# Patient Record
Sex: MALE | Race: WHITE | NOT HISPANIC OR LATINO | Employment: FULL TIME
[De-identification: names, ages, dates, MRNs, and addresses within clinical notes are randomized per-mention and may not be internally consistent; named-entity substitution may affect disease eponyms.]

---

## 2021-01-23 ENCOUNTER — HEALTH MAINTENANCE LETTER (OUTPATIENT)
Age: 61
End: 2021-01-23

## 2021-06-08 ASSESSMENT — ANXIETY QUESTIONNAIRES
7. FEELING AFRAID AS IF SOMETHING AWFUL MIGHT HAPPEN: NOT AT ALL
4. TROUBLE RELAXING: NOT AT ALL
5. BEING SO RESTLESS THAT IT IS HARD TO SIT STILL: SEVERAL DAYS
GAD7 TOTAL SCORE: 1
1. FEELING NERVOUS, ANXIOUS, OR ON EDGE: NOT AT ALL
2. NOT BEING ABLE TO STOP OR CONTROL WORRYING: NOT AT ALL
2. NOT BEING ABLE TO STOP OR CONTROL WORRYING: NOT AT ALL
7. FEELING AFRAID AS IF SOMETHING AWFUL MIGHT HAPPEN: NOT AT ALL
GAD7 TOTAL SCORE: 1
GAD7 TOTAL SCORE: 1
5. BEING SO RESTLESS THAT IT IS HARD TO SIT STILL: SEVERAL DAYS
7. FEELING AFRAID AS IF SOMETHING AWFUL MIGHT HAPPEN: NOT AT ALL
GAD7 TOTAL SCORE: 1
6. BECOMING EASILY ANNOYED OR IRRITABLE: NOT AT ALL
7. FEELING AFRAID AS IF SOMETHING AWFUL MIGHT HAPPEN: NOT AT ALL
4. TROUBLE RELAXING: NOT AT ALL
6. BECOMING EASILY ANNOYED OR IRRITABLE: NOT AT ALL
1. FEELING NERVOUS, ANXIOUS, OR ON EDGE: NOT AT ALL
3. WORRYING TOO MUCH ABOUT DIFFERENT THINGS: NOT AT ALL
GAD7 TOTAL SCORE: 1
3. WORRYING TOO MUCH ABOUT DIFFERENT THINGS: NOT AT ALL
GAD7 TOTAL SCORE: 1

## 2021-06-09 ASSESSMENT — ANXIETY QUESTIONNAIRES
GAD7 TOTAL SCORE: 1
GAD7 TOTAL SCORE: 1

## 2021-06-10 ENCOUNTER — VIRTUAL VISIT (OUTPATIENT)
Dept: INTERNAL MEDICINE | Facility: CLINIC | Age: 61
End: 2021-06-10
Payer: COMMERCIAL

## 2021-06-10 DIAGNOSIS — Z00.00 ENCOUNTER FOR PREVENTATIVE ADULT HEALTH CARE EXAMINATION: Primary | ICD-10-CM

## 2021-06-10 PROCEDURE — 99207 PR NO CHARGE LOS: CPT

## 2021-06-10 RX ORDER — IBUPROFEN 200 MG
200 TABLET ORAL PRN
COMMUNITY

## 2021-06-10 NOTE — PROGRESS NOTES
Health Maintenance:  Do you have a PCP? Yes  When was your last visit with your PCP? 1 year  When was your last eye exam? 1 year  Have you ever had a colonoscopy? Yes   If yes, when? 3 years  Have you ever had any polyps removed? No    As part of your visit we will set up a DEXA scan which will measure your body composition. We have a few questions that need to be answered before we can schedule this scan:   What is your approximate weight? 188   Have you ever had a DEXA scan within the past 2 years? No   Will you have any other imaging studies with contrast (x-ray, CT scan) within 7  days of this appointment? No   Have you had any spine or hip surgery? No   Do you take any vitamins that contain calcium or antacids with calcium? No    If yes, stop taking 24 hours prior to visit.     Goals for the Visit:  1. Thorough Comprehensive Preventive Exam  2. Headaches/screentime  3. Blood Pressure  4. Night eating  Pertinent past Medical/Family and Social HX:   Pertinent sx that desire are addressed with this visit:     Answers for HPI/ROS submitted by the patient on 6/8/2021   RAE 7 TOTAL SCORE: 1  General Symptoms: No  Skin Symptoms: No  HENT Symptoms: No  EYE SYMPTOMS: No  HEART SYMPTOMS: No  LUNG SYMPTOMS: No  INTESTINAL SYMPTOMS: No  URINARY SYMPTOMS: No  REPRODUCTIVE SYMPTOMS: No  SKELETAL SYMPTOMS: No  BLOOD SYMPTOMS: No  NERVOUS SYSTEM SYMPTOMS: No  MENTAL HEALTH SYMPTOMS: No    Instructions prior to appointment:   1. Fast beginning at 10 pm for lab appointment  2. If your preventive care assessment package includes a Fitness Assessment, please bring athletic shoes. Complementary [facility] fitness attire is provided and yours to keep.  3. If eye exam, eyes may be dilated, it will last 4-6 hours, may want to bring sunglasses.   4. May bring laptop or other work materials for use during downtime.   5. You will receive an email about 3 days prior to your visit with a final itinerary, menu selections  for the complementary breakfast and lunch and instructions for the visit.     Complimentary  Parking provided. Drop off car in front of [facility], take the patient elevators to the [facility]. When you enter in the lobby, identify yourself as an Executive [facility] [atient and you will be escorted up to the clinic.   If questions arise prior to your appointment please contact the clinic at [phone].

## 2021-06-16 DIAGNOSIS — I10 HYPERTENSION, UNSPECIFIED TYPE: Primary | ICD-10-CM

## 2021-06-16 NOTE — PROGRESS NOTES
"[facility] Outpatient Medical Nutrition Therapy      Time Spent:  60 minutes  Session Type:  Initial   Referral Source: [employer] Package/Dr. Ceasar Leung  Reason for RD Visit:   Nutritional counseling     Nutrition Assessment:  Patient (Asa) is here for initial annual visit with Registered Dietitian (DANISH).  Asa is a 61 y.o. male with history that includes GERD, hypertension, hyperlipidemia, coronary artery disease, diabetes/prediabetes (noted in EMR). He stated that he is interested in losing some weight. Overall, he feels that his diet is a healthy one but has not previously met with a dietitian. Additionally, he has made some changes to his diet a couple of weeks ago. For many years he had a bowl of cereal for breakfast but 2 weeks ago started having greek yogurt and granola for breakfast. Also over the past 20-30 years, he will sometimes get up and eat in the middle of the night. He believes he might be hungry but uncertain if this is just a habit. If he take one sip of diet sprite, this usually satisfies him and he goes right back to sleep. He is also currently reading a book about changing habits. Additionally, he stated that he used to salt all of his food right away without tasting it first for many years, but discontinued doing this. He was wondering the benefits of discontinuing added salt to foods    Estimated body mass index is 27.26 kg/m  as calculated from the following:    Height as of an earlier encounter on 6/17/21: 1.778 m (5' 10\").    Weight as of an earlier encounter on 6/17/21: 86.2 kg (190 lb).    Diet Recall:  (Some usual meals, snacks and beverages):  Meal Food    Breakfast Greek yogurt with granola OR previously for many years bowl of cereal   Lunch 2-3 oz cheese, ~6 triscuits, 1/2-1 whole avocado, yogurt and if he has some around additionally will have some salmon and/or HB egg, or sometimes dinner leftovers   Dinner Fish/red meat or other meat, salad, fruit or vegetarian " dish with salad and fruit (red meat 1-2x/week, other meat 1-2 x/week, fish 1-2x/week and vegetarian main course 1-2x/week)   Snacks Either none or has a chewy Religious granola bar while biking/exercising for awhile. If gets up in middle of night to eat then cheese and crackers.   Beverages 32 oz water, 2 coffees (am and afternoon, might be iced in summer in afternoon), 1x/week has a latte made with skim milk. Rare juice or tea.   Alcohol Intake 1 beer every day after dinner (between 8-9 pm), occasionally has a white Belizean too (estimated ~2 per week).     Frequency of eating/taking out meals: ~1x/week usually has fish, veg and rice or potato when eating out.     Labs:  Reviewed EMR.    Pertinent Medications/vitamin and mineral supplements:     Current Outpatient Medications   Medication     atorvastatin (LIPITOR) 20 MG tablet     diltiazem ER (TIAZAC) 300 MG 24 hr ER beaded capsule     fluocinonide (LIDEX) 0.05 % external solution     ibuprofen (ADVIL/MOTRIN) 200 MG tablet     irbesartan-hydrochlorothiazide (AVALIDE) 300-12.5 MG tablet     loratadine-pseudoePHEDrine (CLARITIN-D 12-HOUR) 5-120 MG 12 hr tablet     No current facility-administered medications for this visit.      Food Allergies:  NKFA     Physical Activity:  6-7 times per week exercises for about 2-3 hours. Road and mountain biking, canoeing, standing board, kettlebells (1-2x/week for 45 minutes). In winter cross country skiing and snowshoeing and biking on snow.    Nutrition Diagnosis:    Food and nutrition related knowledge deficit related to lack of previous diet education as evidenced by pt report and interest in diet education.    Nutrition Prescription: Recommended general healthful diet/The Plate method    Nutrition Intervention:    Nutrition Education/Counseling:  -Provided general healthful diet nutrition education with tips and suggestions.   -Reviewed the Plate method meal plan with food groups and some example foods in groups.   --Discussed  "some specific tips and suggestion for foods he can add into meals to help get balanced meals and those based on his food preferences.  -Discussed general sodium recommendations of getting less than 2300 mg/day. Discussed some low sodium diet tips. Answered his question about the issue with too much sodium in diet especially as it relates to heart health and high blood pressure.  -Reviewed the difference between unsaturated and saturated fats with recommendation to aim for choosing unsaturated fat over saturated fats and discussed examples of both.  -Discussed adequate hydration/recommendations and encouraged pt to drink at least 64 oz (8 cups) per day.   --discussed other weight management tips and suggestions. Recommended eating slowing, stopping when satisfied, limiting alcoholic beverages, limit snack an okay for planned snack if going a long stretch between meals.    Answered patient's questions. Patient verbalized understanding of education provided. Provided pt with RD contact information and list of goals below.     Educational Materials Provided:  [employer] Daily Nutrition Plate method handout     Goals:    1. Keep daily food and beverage journals. Can use an rober such as \"Lose It\" or \"My Fitness Pal\" or in a notebook or other rober that you prefer.    2. Eat 3 balanced meals per day. Can use the Plate Method meal plan for general guidance on getting balanced meals.  --Use a smaller plate  --Make half your plate vegetables and fruit  --1/4 of your plate (~1 cup or less, whole grain starches).  --The other 1/4 of your plate (~3 oz/deck of cards size or 3/4 cup) of a lean protein food.  --Include a couple of healthy (more unsaturated fat) servings at a meal (for example 2T avocado or 1 tsp olive oil or 6 nuts or 1T nut butter = 1 fat serving).    3. If going longer than 4-6 hours between meals, such as you do between lunch and dinner meal, add in a healthy snacks (~100 calories).    4. Limit alcoholic beverages to " 1 standard drink per day or less (1 standard drink= 12 oz beer, 5 oz wine or 1.5 oz hard liquor).    5. Increase water to drink at least 64 oz (8 cups) per day.    6. Some ideas for your usual meal to make them more balanced include the following:  --Add in a fruit serving at breakfast and choose a low sugar, high protein granola.  --Add in some vegetables at lunch meal.  --Continue eating balanced dinner meal, but see if one plate is enough. If you still feel hungry after eating, can try having some additional vegetables and/or 1 oz of protein foods for example.  --If you will go a long stretch between lunch and dinner (longer than 4-5 hours), then add in a healthy planned snack at that time.    7. Eat slowly and take at least 20-30 minutes to eat a meal. This will give you time to feel more satisfied.    8. Avoid distractions while eating (such as working, tv, using the computer, using the phone).    Nutrition Monitoring and Evaluation: Will monitor adherence to nutrition recommendations at future RD visits.     Further Medical Nutrition Therapy:  Annual visits  Patient was encouraged to call/contact RD with any further questions.    Connie Reynolds, MS, RD, LD

## 2021-06-17 ENCOUNTER — APPOINTMENT (OUTPATIENT)
Dept: INTERNAL MEDICINE | Facility: CLINIC | Age: 61
End: 2021-06-17
Payer: COMMERCIAL

## 2021-06-17 ENCOUNTER — OFFICE VISIT (OUTPATIENT)
Dept: INTERNAL MEDICINE | Facility: CLINIC | Age: 61
End: 2021-06-17
Payer: COMMERCIAL

## 2021-06-17 ENCOUNTER — OFFICE VISIT (OUTPATIENT)
Dept: DERMATOLOGY | Facility: CLINIC | Age: 61
End: 2021-06-17
Payer: COMMERCIAL

## 2021-06-17 ENCOUNTER — OFFICE VISIT (OUTPATIENT)
Dept: AUDIOLOGY | Facility: CLINIC | Age: 61
End: 2021-06-17
Payer: COMMERCIAL

## 2021-06-17 ENCOUNTER — OFFICE VISIT (OUTPATIENT)
Dept: GASTROENTEROLOGY | Facility: CLINIC | Age: 61
End: 2021-06-17
Payer: COMMERCIAL

## 2021-06-17 ENCOUNTER — OFFICE VISIT (OUTPATIENT)
Dept: OPHTHALMOLOGY | Facility: CLINIC | Age: 61
End: 2021-06-17
Payer: COMMERCIAL

## 2021-06-17 ENCOUNTER — ANCILLARY PROCEDURE (OUTPATIENT)
Dept: BONE DENSITY | Facility: CLINIC | Age: 61
End: 2021-06-17
Payer: COMMERCIAL

## 2021-06-17 VITALS
BODY MASS INDEX: 27.2 KG/M2 | SYSTOLIC BLOOD PRESSURE: 130 MMHG | HEART RATE: 50 BPM | TEMPERATURE: 98 F | RESPIRATION RATE: 16 BRPM | DIASTOLIC BLOOD PRESSURE: 75 MMHG | HEIGHT: 70 IN | WEIGHT: 190 LBS | OXYGEN SATURATION: 98 %

## 2021-06-17 DIAGNOSIS — D22.9 MULTIPLE BENIGN NEVI: ICD-10-CM

## 2021-06-17 DIAGNOSIS — R51.9 CHRONIC NONINTRACTABLE HEADACHE, UNSPECIFIED HEADACHE TYPE: ICD-10-CM

## 2021-06-17 DIAGNOSIS — H02.834 DERMATOCHALASIS OF BOTH UPPER EYELIDS: ICD-10-CM

## 2021-06-17 DIAGNOSIS — R73.01 IMPAIRED FASTING GLUCOSE: ICD-10-CM

## 2021-06-17 DIAGNOSIS — Z00.00 ENCOUNTER FOR PREVENTATIVE ADULT HEALTH CARE EXAMINATION: ICD-10-CM

## 2021-06-17 DIAGNOSIS — E78.5 DYSLIPIDEMIA: ICD-10-CM

## 2021-06-17 DIAGNOSIS — E66.3 OVERWEIGHT: ICD-10-CM

## 2021-06-17 DIAGNOSIS — L91.8 SKIN TAG: Primary | ICD-10-CM

## 2021-06-17 DIAGNOSIS — R06.83 SNORING: ICD-10-CM

## 2021-06-17 DIAGNOSIS — I51.7 LEFT VENTRICULAR HYPERTROPHY: ICD-10-CM

## 2021-06-17 DIAGNOSIS — Z83.518 FAMILY HISTORY OF MACULAR DEGENERATION: ICD-10-CM

## 2021-06-17 DIAGNOSIS — M85.9 LOW BONE DENSITY: ICD-10-CM

## 2021-06-17 DIAGNOSIS — Z00.00 ENCOUNTER FOR PREVENTATIVE ADULT HEALTH CARE EXAMINATION: Primary | ICD-10-CM

## 2021-06-17 DIAGNOSIS — Z71.3 NUTRITIONAL COUNSELING: Primary | ICD-10-CM

## 2021-06-17 DIAGNOSIS — H52.13 MYOPIA OF BOTH EYES: Primary | ICD-10-CM

## 2021-06-17 DIAGNOSIS — H25.813 COMBINED FORMS OF AGE-RELATED CATARACT OF BOTH EYES: ICD-10-CM

## 2021-06-17 DIAGNOSIS — L73.9 FOLLICULITIS: ICD-10-CM

## 2021-06-17 DIAGNOSIS — H02.831 DERMATOCHALASIS OF BOTH UPPER EYELIDS: ICD-10-CM

## 2021-06-17 DIAGNOSIS — D23.10 PAPILLOMA OF EYELID, UNSPECIFIED LATERALITY: ICD-10-CM

## 2021-06-17 DIAGNOSIS — G89.29 CHRONIC NONINTRACTABLE HEADACHE, UNSPECIFIED HEADACHE TYPE: ICD-10-CM

## 2021-06-17 DIAGNOSIS — H93.13 TINNITUS OF BOTH EARS: Primary | ICD-10-CM

## 2021-06-17 DIAGNOSIS — I10 HYPERTENSION, UNSPECIFIED TYPE: ICD-10-CM

## 2021-06-17 DIAGNOSIS — Z71.82 EXERCISE COUNSELING: Primary | ICD-10-CM

## 2021-06-17 LAB
ALBUMIN UR-MCNC: NEGATIVE MG/DL
ALP SERPL-CCNC: 72 U/L (ref 40–150)
ALT SERPL W P-5'-P-CCNC: 37 U/L (ref 0–70)
APPEARANCE UR: CLEAR
BASOPHILS # BLD AUTO: 0 10E9/L (ref 0–0.2)
BASOPHILS NFR BLD AUTO: 0.8 %
BILIRUB UR QL STRIP: NEGATIVE
CALCIUM SERPL-MCNC: 8.6 MG/DL (ref 8.5–10.1)
CHOLEST SERPL-MCNC: 125 MG/DL
COLOR UR AUTO: YELLOW
CREAT SERPL-MCNC: 0.71 MG/DL (ref 0.66–1.25)
DEPRECATED CALCIDIOL+CALCIFEROL SERPL-MC: 38 UG/L (ref 20–75)
DIFFERENTIAL METHOD BLD: NORMAL
EOSINOPHIL # BLD AUTO: 0.3 10E9/L (ref 0–0.7)
EOSINOPHIL NFR BLD AUTO: 5.9 %
ERYTHROCYTE [DISTWIDTH] IN BLOOD BY AUTOMATED COUNT: 12.7 % (ref 10–15)
GFR SERPL CREATININE-BSD FRML MDRD: >90 ML/MIN/{1.73_M2}
GLUCOSE SERPL-MCNC: 100 MG/DL (ref 70–99)
GLUCOSE UR STRIP-MCNC: NEGATIVE MG/DL
HCT VFR BLD AUTO: 41.9 % (ref 40–53)
HCV AB SERPL QL IA: NONREACTIVE
HDLC SERPL-MCNC: 68 MG/DL
HGB BLD-MCNC: 14.3 G/DL (ref 13.3–17.7)
HGB UR QL STRIP: NEGATIVE
HIV 1+2 AB+HIV1 P24 AG SERPL QL IA: NONREACTIVE
IMM GRANULOCYTES # BLD: 0 10E9/L (ref 0–0.4)
IMM GRANULOCYTES NFR BLD: 0.2 %
KETONES UR STRIP-MCNC: NEGATIVE MG/DL
LDLC SERPL CALC-MCNC: 48 MG/DL
LEUKOCYTE ESTERASE UR QL STRIP: NEGATIVE
LYMPHOCYTES # BLD AUTO: 1.3 10E9/L (ref 0.8–5.3)
LYMPHOCYTES NFR BLD AUTO: 25.9 %
MCH RBC QN AUTO: 31.5 PG (ref 26.5–33)
MCHC RBC AUTO-ENTMCNC: 34.1 G/DL (ref 31.5–36.5)
MCV RBC AUTO: 92 FL (ref 78–100)
MONOCYTES # BLD AUTO: 0.6 10E9/L (ref 0–1.3)
MONOCYTES NFR BLD AUTO: 12.3 %
MUCOUS THREADS #/AREA URNS LPF: PRESENT /LPF
NEUTROPHILS # BLD AUTO: 2.7 10E9/L (ref 1.6–8.3)
NEUTROPHILS NFR BLD AUTO: 54.9 %
NITRATE UR QL: NEGATIVE
NONHDLC SERPL-MCNC: 58 MG/DL
NRBC # BLD AUTO: 0 10*3/UL
NRBC BLD AUTO-RTO: 0 /100
PH UR STRIP: 8 PH (ref 5–7)
PHOSPHATE SERPL-MCNC: 2.6 MG/DL (ref 2.5–4.5)
PLATELET # BLD AUTO: 180 10E9/L (ref 150–450)
POTASSIUM SERPL-SCNC: 3.7 MMOL/L (ref 3.4–5.3)
PSA SERPL-ACNC: 1.55 UG/L (ref 0–4)
RBC # BLD AUTO: 4.54 10E12/L (ref 4.4–5.9)
RBC #/AREA URNS AUTO: 0 /HPF (ref 0–2)
SODIUM SERPL-SCNC: 143 MMOL/L (ref 133–144)
SOURCE: ABNORMAL
SP GR UR STRIP: 1.01 (ref 1–1.03)
TRIGL SERPL-MCNC: 47 MG/DL
TSH SERPL DL<=0.005 MIU/L-ACNC: 1.85 MU/L (ref 0.4–4)
UROBILINOGEN UR STRIP-MCNC: 0 MG/DL (ref 0–2)
WBC # BLD AUTO: 5 10E9/L (ref 4–11)
WBC #/AREA URNS AUTO: <1 /HPF (ref 0–5)

## 2021-06-17 PROCEDURE — 99386 PREV VISIT NEW AGE 40-64: CPT | Performed by: INTERNAL MEDICINE

## 2021-06-17 PROCEDURE — 86803 HEPATITIS C AB TEST: CPT | Mod: 90 | Performed by: INTERNAL MEDICINE

## 2021-06-17 PROCEDURE — 94375 RESPIRATORY FLOW VOLUME LOOP: CPT | Performed by: INTERNAL MEDICINE

## 2021-06-17 PROCEDURE — 84460 ALANINE AMINO (ALT) (SGPT): CPT | Performed by: PATHOLOGY

## 2021-06-17 PROCEDURE — 82310 ASSAY OF CALCIUM: CPT | Performed by: PATHOLOGY

## 2021-06-17 PROCEDURE — 84244 ASSAY OF RENIN: CPT | Mod: 90 | Performed by: INTERNAL MEDICINE

## 2021-06-17 PROCEDURE — 99000 SPECIMEN HANDLING OFFICE-LAB: CPT | Performed by: INTERNAL MEDICINE

## 2021-06-17 PROCEDURE — 36415 COLL VENOUS BLD VENIPUNCTURE: CPT | Performed by: INTERNAL MEDICINE

## 2021-06-17 PROCEDURE — 92557 COMPREHENSIVE HEARING TEST: CPT | Performed by: AUDIOLOGIST

## 2021-06-17 PROCEDURE — 80061 LIPID PANEL: CPT | Performed by: PATHOLOGY

## 2021-06-17 PROCEDURE — 99207 PR NO CHARGE LOS: CPT

## 2021-06-17 PROCEDURE — 93000 ELECTROCARDIOGRAM COMPLETE: CPT | Performed by: INTERNAL MEDICINE

## 2021-06-17 PROCEDURE — 82306 VITAMIN D 25 HYDROXY: CPT | Mod: 90 | Performed by: INTERNAL MEDICINE

## 2021-06-17 PROCEDURE — 84165 PROTEIN E-PHORESIS SERUM: CPT | Mod: 90 | Performed by: PATHOLOGY

## 2021-06-17 PROCEDURE — 99N1036 PR STATISTIC TOTAL PROTEIN: Mod: 90 | Performed by: INTERNAL MEDICINE

## 2021-06-17 PROCEDURE — 96999 UNLISTED SPEC DERM SVC/PX: CPT | Performed by: INTERNAL MEDICINE

## 2021-06-17 PROCEDURE — 99207 PR NO BILLABLE SERVICE THIS VISIT: CPT | Performed by: DIETITIAN, REGISTERED

## 2021-06-17 PROCEDURE — 81001 URINALYSIS AUTO W/SCOPE: CPT | Performed by: PATHOLOGY

## 2021-06-17 PROCEDURE — 84295 ASSAY OF SERUM SODIUM: CPT | Performed by: PATHOLOGY

## 2021-06-17 PROCEDURE — 84132 ASSAY OF SERUM POTASSIUM: CPT | Performed by: PATHOLOGY

## 2021-06-17 PROCEDURE — 99203 OFFICE O/P NEW LOW 30 MIN: CPT | Performed by: PHYSICIAN ASSISTANT

## 2021-06-17 PROCEDURE — 84100 ASSAY OF PHOSPHORUS: CPT | Performed by: PATHOLOGY

## 2021-06-17 PROCEDURE — 84443 ASSAY THYROID STIM HORMONE: CPT | Performed by: PATHOLOGY

## 2021-06-17 PROCEDURE — 92550 TYMPANOMETRY & REFLEX THRESH: CPT | Performed by: AUDIOLOGIST

## 2021-06-17 PROCEDURE — 77080 DXA BONE DENSITY AXIAL: CPT | Performed by: INTERNAL MEDICINE

## 2021-06-17 PROCEDURE — 87389 HIV-1 AG W/HIV-1&-2 AB AG IA: CPT | Mod: 90 | Performed by: INTERNAL MEDICINE

## 2021-06-17 PROCEDURE — 84075 ASSAY ALKALINE PHOSPHATASE: CPT | Performed by: PATHOLOGY

## 2021-06-17 PROCEDURE — 85025 COMPLETE CBC W/AUTO DIFF WBC: CPT | Performed by: PATHOLOGY

## 2021-06-17 PROCEDURE — 84403 ASSAY OF TOTAL TESTOSTERONE: CPT | Mod: 90 | Performed by: INTERNAL MEDICINE

## 2021-06-17 PROCEDURE — 82947 ASSAY GLUCOSE BLOOD QUANT: CPT | Performed by: PATHOLOGY

## 2021-06-17 PROCEDURE — 92004 COMPRE OPH EXAM NEW PT 1/>: CPT | Performed by: OPTOMETRIST

## 2021-06-17 PROCEDURE — 92015 DETERMINE REFRACTIVE STATE: CPT | Performed by: OPTOMETRIST

## 2021-06-17 PROCEDURE — 82565 ASSAY OF CREATININE: CPT | Performed by: PATHOLOGY

## 2021-06-17 PROCEDURE — G0103 PSA SCREENING: HCPCS | Mod: 90 | Performed by: PATHOLOGY

## 2021-06-17 PROCEDURE — 82088 ASSAY OF ALDOSTERONE: CPT | Mod: 90 | Performed by: INTERNAL MEDICINE

## 2021-06-17 RX ORDER — CLINDAMYCIN PHOSPHATE 11.9 MG/ML
SOLUTION TOPICAL
Qty: 60 ML | Refills: 1 | Status: SHIPPED | OUTPATIENT
Start: 2021-06-17

## 2021-06-17 ASSESSMENT — CONF VISUAL FIELD
METHOD: COUNTING FINGERS
OS_NORMAL: 1
OD_NORMAL: 1

## 2021-06-17 ASSESSMENT — REFRACTION_WEARINGRX
OS_CYLINDER: +2.50
OS_SPHERE: -3.75
OD_CYLINDER: +2.00
OD_ADD: +2.50
SPECS_TYPE: PAL
OS_AXIS: 175
OS_ADD: +2.50
OD_AXIS: 178
OD_SPHERE: -3.25

## 2021-06-17 ASSESSMENT — VISUAL ACUITY
CORRECTION_TYPE: GLASSES
OD_CC: 20/20
OS_CC: 20/20
METHOD: SNELLEN - LINEAR

## 2021-06-17 ASSESSMENT — REFRACTION_MANIFEST
OS_AXIS: 175
OS_ADD: +2.50
OD_SPHERE: -3.50
OS_CYLINDER: +2.25
OD_ADD: +2.50
OD_AXIS: 178
OD_CYLINDER: +1.75
OS_SPHERE: -3.75

## 2021-06-17 ASSESSMENT — TONOMETRY
OD_IOP_MMHG: 15
OS_IOP_MMHG: 14
IOP_METHOD: ICARE

## 2021-06-17 ASSESSMENT — EXTERNAL EXAM - RIGHT EYE: OD_EXAM: NORMAL

## 2021-06-17 ASSESSMENT — CUP TO DISC RATIO
OD_RATIO: 0.5
OS_RATIO: 0.5

## 2021-06-17 ASSESSMENT — MIFFLIN-ST. JEOR: SCORE: 1663.08

## 2021-06-17 ASSESSMENT — EXTERNAL EXAM - LEFT EYE: OS_EXAM: NORMAL

## 2021-06-17 ASSESSMENT — PAIN SCALES - GENERAL
PAINLEVEL: NO PAIN (0)
PAINLEVEL: NO PAIN (0)

## 2021-06-17 NOTE — OUTPATIENT NURSE NOTE
06/17/21 1002   Fitness   Current Fitness Regimen:  Exercises 6-7 d/wk. Summer: single track mntn biking ~2 d/wk, 60-90 min; paddling ~2 d/wk,  min; soccer 1 d/wk, kettlebell & resistance training 1 d/wk, 45-60 min. Winter: skiing ~ 4 d/wk ~60+ min; fat tire biking ~ 2 d/wk 60+ min   Fitness Goals Continue to exercise as long as possible, perform well during races, lose ~ 10 lbs   Timeline   Recommended Activity this Week Continue current routine, but add interval workout 1 d/wk   Recommended Minutes per Day this Week 60 Min   Recommended Number of Days this Week 6 Per Day/Per Week   Recommended Activity this Month As above, but increase weight lifting to 2 d/wk   Recommended Duration this Month 60 Min/Hrs   Recommended Frequency this Month 6 Per Day/Per Week   Recommended Activity the Nex 3 Months As above   Recommended Duration the Nex 3 Months 60 Min/Hrs   Recommended Frequency the Nex 3 Months:  6 Per Day/Per Week   VO2 Max   VO2MAX:  30.5 ml/kg/min   VO2- max Percentile 25 %   Fitness Level Poor    Strength    Strength (Right):  100.7 ml/kg/min    Strength (Left) 91.5 ml/kg/min

## 2021-06-17 NOTE — NURSING NOTE
Chief Complaints and History of Present Illnesses   Patient presents with     COMPREHENSIVE EYE EXAM     Chief Complaint(s) and History of Present Illness(es)     COMPREHENSIVE EYE EXAM     Laterality: both eyes    Onset: years ago    Frequency: constantly    Course: stable    Treatments tried: no treatments    Pain scale: 0/10              Comments     Last eye exam 1.5 years. Pt feels right eye seems a little cloudy. Otherwise vision fine. No past eye surgery. CL once a week to play soccer.     VINICIUS Hernandez COT 10:27 AM June 17, 2021

## 2021-06-17 NOTE — NURSING NOTE
Chief Complaint   Patient presents with     Physical     Patient is here for annual physical     Carly Latif CMA 7:00 AM on 6/17/2021

## 2021-06-17 NOTE — PATIENT INSTRUCTIONS
"It was nice meeting you today. Below are the nutrition recommendations we discussed at your visit.    Please let me know if you have any additional questions.    Nutrition Recommendations    1. Keep daily food and beverage journals. Can use an rober such as \"Lose It\" or \"My Fitness Pal\" or in a notebook or other rober that you prefer.    2. Eat 3 balanced meals per day. Can use the Plate Method meal plan for general guidance on getting balanced meals.  --Use a smaller plate  --Make half your plate vegetables and fruit  --1/4 of your plate (~1 cup or less, whole grain starches).  --The other 1/4 of your plate (~3 oz/deck of cards size or 3/4 cup) of a lean protein food.  --Include a couple of healthy (more unsaturated fat) servings at a meal (for example 2T avocado or 1 tsp olive oil or 6 nuts or 1T nut butter = 1 fat serving).    3. If going longer than 4-6 hours between meals, such as you do between lunch and dinner meal, add in a healthy snacks (~100 calories).    4. Limit alcoholic beverages to 1 standard drink per day or less (1 standard drink= 12 oz beer, 5 oz wine or 1.5 oz hard liquor).    5. Increase water to drink at least 64 oz (8 cups) per day.    6. Some ideas for your usual meal to make them more balanced include the following:  --Add in a fruit serving at breakfast and choose a low sugar, high protein granola.  --Add in some vegetables at lunch meal.  --Continue eating balanced dinner meal, but see if one plate is enough. If you still feel hungry after eating, can try having some additional vegetables and/or 1 oz of protein foods for example.  --If you will go a long stretch between lunch and dinner (longer than 4-5 hours), then add in a healthy planned snack at that time.    7. Eat slowly and take at least 20-30 minutes to eat a meal. This will give you time to feel more satisfied.    8. Avoid distractions while eating (such as working, tv, using the computer, using the phone).    Connie Reynolds MS, RD, " LD

## 2021-06-17 NOTE — PATIENT INSTRUCTIONS
Asa,    It was great to meet you today! I was impressed by the wide variety and duration of exercise you get on a regular basis. I think next year we'll get more accurate results for the VO2max test given that you'll know what the feeling of the mask is like. Despite that, I think adding some more intense intervals to your routine would be beneficial. Here are my recommendations:    1.) Add 1 d/wk VO2max intervals. These should be in zone 5. Use a 1:1 or up to a 1:2 work:recovery ratio. The work should ideally last 2-3 minutes, but start with less if you need or move beyond 3 minutes if your fitness improves. When you finish an interval, you should feel like you could have gone another 20-30 seconds. Also, when you finish the last interval, you should feel like you could still complete another interval if you had to. Start with 3-4 intervals and increase from there. Finally, you can do this type of workout 2 d/wk after this becomes a regular part of your routine, but I would not go beyond 2.    2.) Increase your lifting to 2 d/wk, ideally  by at least 1 day. There are several reasons, but perhaps the best are that it will extend how long you can keep doing your favorite activities.    3.) Use your standing desk more to break up your sitting. You don't need to stand for extended periods of time, but breaking up your sitting about once an hour and standing for a while will help.      If you have questions, please email me; I enjoy hearing from patients!    Waldemar Frausto MS, Exercise Phsiology  [email or web address]

## 2021-06-17 NOTE — PROGRESS NOTES
[facility] Dermatology Note  Encounter Date: Jun 17, 2021  Office Visit      Dermatology Problem List:  1. Scalp folliculitis/Prurigo nodules - clindamycin 1% solution, fluocinonide 0.05% solution alternating    Social: Lives in [place], [place]  ____________________________________________    Assessment & Plan:  # Multiple clinically benign nevi on the trunk and extremities.   - ABCDEs: Counseled ABCDEs of melanoma: Asymmetry, Border (irregularity), Color (not uniform, changes in color), Diameter (greater than 6 mm which is about the size of a pencil eraser), and Evolving (any changes in preexisting moles).  - Monitor: Patient to continue monitoring at home and will contact the clinic for any changes.  - Sun protection: Counseled SPF30+ sunscreen, UPF clothing, sun avoidance, tanning bed avoidance.  - Continue with annual skin exams     # Acrochordon, non irritated.   - NTD: No further management at this time.     # Scalp folliculitis with early prurigo nodule formation  -continue fluocinonide solution every day  -start clindamycin 1% solution once to twice daily  -avoid picking, scratching or otherwise traumatizing the lesions as this prevents healing     Procedures Performed:   None    Follow-up: 1 year(s) in-person, or earlier for new or changing lesions    Staff:     All risks, benefits and alternatives were discussed with patient.  Patient is in agreement and understands the assessment and plan.  All questions were answered.    Ibis Raymundo PA-C, MPAS  [facility] [place]: Phone: [phone], Fax: [phone]  [facility]: Phone: [phone],  Fax: [phone]  ____________________________________________    CC: Skin Check (Asa is here for a skin check. He has lesions on his neck. He also has skin tags around his eyes. )      HPI:  Mr. Jeancarlos Ureña is a 61 year old male who presents today as a new  patient for a [facility] skin screening. Notes bumps on the back of the scalp. Using fluocinonide solution on scalp - using once per day now. Was using it 3-4x per day previously. Has had bumps on back of neck/scalp for better part of the year. Does see dermatology regularly, [facility]. Has had biopsies but nothing abnml. No fhx melanoma. No personal hx of skin cancer.     Patient is otherwise feeling well, without additional concerns.    Labs:  none    Physical Exam:  Vitals: There were no vitals taken for this visit.  SKIN: Full skin, which includes the head/face, both arms, chest, back, abdomen,both legs, genitalia and/or groin buttocks, digits and/or nails, was examined.   - posterior neck./occipital scalp -4-5 pink papules  - Multiple regular brown pigmented macules and papules are identified on the trunk and extremities, greater than 100 - signature nevus is very dark  -Berman's skin type III.   -There is(are) skin colored pedunculated papules on the face.   - No other lesions of concern on areas examined.     Medications:  Current Outpatient Medications   Medication     atorvastatin (LIPITOR) 20 MG tablet     diltiazem ER (TIAZAC) 300 MG 24 hr ER beaded capsule     fluocinonide (LIDEX) 0.05 % external solution     ibuprofen (ADVIL/MOTRIN) 200 MG tablet     irbesartan-hydrochlorothiazide (AVALIDE) 300-12.5 MG tablet     loratadine-pseudoePHEDrine (CLARITIN-D 12-HOUR) 5-120 MG 12 hr tablet     No current facility-administered medications for this visit.       Past Medical/Surgical History:   There is no problem list on file for this patient.    Past Medical History:   Diagnosis Date     Coronary artery disease 15 years ago    Enlarged heart?     Diabetes (H) Five years ago?    Pre-diabetes     Gastroesophageal reflux disease 2 and 20 years ago    Difficulty swallowing     Hyperlipidemia 2-3 years ago    Medication and now low     Hypertension Twenty years ago?    Two pills each day - 135/75        CC Dr. Knowles on close of this encounter.

## 2021-06-17 NOTE — LETTER
"June 24, 2021      Jeancarlos Ureña  [address] [place] [id number]              Dear Asa,     It was a pleasure to have met you at your recent visit to the [facility].  I am writing to report the results of your tests, and to review several recommendations.     A complete blood count was normal, including a white blood cell count of 5000, a hemoglobin level of 14.3, and a platelet count of 180,000.  A creatinine level, which measures kidney function, was normal at 0.71 milligrams/deciliter.  Alkaline phosphatase and alanine aminotransferase (ALT), enzymes that measure liver activity, were normal at 72 and 37 units/liter, respectively.     Your total cholesterol level was 125, with a triglyceride level of 47, an HDL or \"good\" cholesterol level of 68, an LDL or \"bad\" cholesterol level of 48, and a cholesterol/HDL ratio of 1.8.  Guidelines from the American Heart Association and American College of Cardiology would suggest continued use of a moderate-intensity statin, such as your atorvastatin, with daily use of low-strength (81 mg) aspirin.     A PSA level, which screens for prostate problems, was acceptable at 1.55.  A thyroid stimulating hormone (TSH) level, which measures thyroid function, was normal at 1.85.  Your 25-hydroxy vitamin D level was 38 (our normal range is 20-75).  Screens for HIV and hepatitis C were nonreactive, or negative/normal.  Analysis of your urine was notable only for a marginally elevated pH of 8.0.    Your glucose level was marginally elevated at 100.  Blood glucose readings in the range of 100-125 are considered evidence of impaired fasting glucose, or \"pre-diabetes\".  Maintenance of ideal weight, regular exercise, and a reduced-calorie diet, which specifically avoids sugars and starches, all can help postpone or prevent the development of diabetes.  In this setting, many diabetes experts suggest semiannual measurement of fasting glucose and " "glycosylated hemoglobin levels and annual measurement of urine albumin/creatinine.     An EKG was notable for sinus bradycardia with first-degree AV block and voltage findings consistent with left ventricular hypertrophy, all of which were noted previously.  A spirometry test, which measures lung function, showed an FEV1 of 3.20, with an FVC of 4.03; these readings were 92% and 88% of predicted values, respectively.     Dual energy x-ray absorptiometry (DEXA) showed low bone density, with most negative and valid T-score of -1.2 at the right femoral neck.  Additional tests to screen for underlying causes of bone loss include normal levels of calcium (8.6), phosphorus (2.6), and testosterone (363); a serum protein electrophoresis was notable for a small monoclonal protein in the gamma region.  I recommend that you return for or arrange through your usual clinic blood and urine tests for serum and urine immunofixation to further characterize this nonspecific finding.  In addition, you were advised to submit a 24-hour urine specimen for analysis of calcium excretion.  Further recommendations can be based on these test results.    To optimize bone health, you were advised to begin daily use of a 50 mcg (2000 international unit) vitamin D3 supplement, while maintaining a regimen of regular weight-bearing exercise and daily calcium intake of 1200 mg, preferably from dietary sources.    Body composition analysis showed 29.2% fat (82nd percentile).  Your body mass index was 27.3; readings of 20-25 are considered normal.  To facilitate weight and body fat loss, while building muscle mass, you were advised to continue a regimen of regular exercise, including aerobic (\"cardio\") and strength training, along with \"subthreshold\" exercise, during which pulse is maintained below 70% of maximum (in your case, roughly 110 beats per minute) to facilitate \"fat-burning\".  In addition, we discussed the time economy associated with " high-intensity interval training, such as a brief workout of 30 seconds of intense exercise, followed by 60 seconds of rest, repeated 5 times.    At the time of your appointment, your blood pressure was 133/78 on average of 3 automated readings.  Based on your report of consistently elevated blood pressure readings despite the use of 3 antihypertensive medications, you were advised to arrange a sleep clinic evaluation to exclude the possibility of obstructive sleep apnea.  In addition, screening tests for primary aldosteronism were arranged.  The preliminary tests showed a high-normal sodium level of 143, a low-normal potassium level of 3.7, and acceptable levels of aldosterone (11.5), and renin (2.4).  While these results are not diagnostic for primary aldosteronism, I believe further testing is warranted since use of a diuretic (hydrochlorothiazide, a component of Avalide) can result in false negative (normal) readings.  I suggest that you discuss with your usual internist or a nephrologist the possibility of repeating these tests after 2 weeks without diuretic treatment.    To address your headaches, which I suspect are related to chronic use of analgesics, you were advised to avoid all use of ibuprofen, naproxen, and acetaminophen, and to pursue further evaluation if your headaches persist beyond 3-4 weeks.  In the event of infrequent future headaches, I recommended a trial of acetaminophen/aspirin/caffeine (Excedrin, and others) as needed.    With regard to preventive care, you indicated that 10-year follow-up was recommended at the time of a colonoscopy in 2018.  I recommend that you proceed with a recombinant zoster (shingles) vaccination series at your convenience.  You were commended for your regimen of regular exercise, and advised to implement the modifications noted above.     Asa, I enjoyed visiting with you at your recent appointment and learning about your family members and their impressive  accomplishments. Thank you for choosing the [facility] program and [facility] for your medical needs.  Best wishes for a productive and healthy year.  Please do not hesitate to contact me with any questions regarding these results or recommendations.           Sincerely,           Ceasar Leung M.D.  [facility]   Division of General Internal Medicine  Department of Medicine

## 2021-06-17 NOTE — PROGRESS NOTES
History  HPI     COMPREHENSIVE EYE EXAM     In both eyes.  This started years ago.  Occurring constantly.  Since onset it is stable.  Treatments tried include no treatments.  Pain was noted as 0/10.              Comments     Last eye exam 1.5 years. Pt feels right eye seems a little cloudy. Otherwise vision fine. No past eye surgery. CL once a week to play soccer.     VINICIUS Hernandez COT 10:27 AM June 17, 2021             Last edited by Gino Durant COT on 6/17/2021 10:27 AM. (History)          Assessment/Plan  (H52.13) Myopia of both eyes  (primary encounter diagnosis)  Comment: Myopic astigmatism with presbyopia both eyes   Plan: REFRACTION   Educated patient on condition and clinical findings. Dispensed spectacle prescription for full time wear. Monitor annually.    (Z83.518) Family history of macular degeneration  Comment: Mother with macular degeneration, normal macula on examination today  Plan:  No treatment indicated at this time. Monitor annually.    (H25.813) Combined forms of age-related cataract of both eyes  Comment: Nuclear sclerosis both eyes with cortical spoking right eye, not visually significant  Plan:  No treatment indicated at this time. Monitor annually.    (H02.831,  H02.834) Dermatochalasis of both upper eyelids  Comment: Brow recruitment both eyes, patient asymptomatic  Plan:  Offered oculoplastics consultation. Patient not interested at this time. Follow-up as needed.    (D23.10) Papilloma of eyelid, unspecified laterality  Comment: Multiple eyelid and facial papillomas  Plan:  Patient is planning on discussing removal with dermatology. If interested, refer to oculoplastics for excision.    Return to clinic in 1 year for comprehensive eye exam.    Complete documentation of historical and exam elements from today's encounter can  be found in the full encounter summary report (not reduplicated in this progress  note). I personally obtained the chief complaint(s) and history of present  illness. I  confirmed and edited as necessary the review of systems, past medical/surgical  history, family history, social history, and examination findings as documented by  others; and I examined the patient myself. I personally reviewed the relevant tests,  images, and reports as documented above. I formulated and edited as necessary the  assessment and plan and discussed the findings and management plan with the  patient and family.    Dayday Worthy OD, FAAO

## 2021-06-17 NOTE — LETTER
6/17/2021       RE: Jeancarlos Ureña  [address] [facility] [address]     Dear Colleague,    Thank you for referring your patient, Jeancarlos Ureña, to the [facility] [place] at [facility]. Please see a copy of my visit note below.    [facility] Dermatology Note  Encounter Date: Jun 17, 2021  Office Visit      Dermatology Problem List:  1. Scalp folliculitis/Prurigo nodules - clindamycin 1% solution, fluocinonide 0.05% solution alternating    Social: Lives in [place], [place]  ____________________________________________    Assessment & Plan:  # Multiple clinically benign nevi on the trunk and extremities.   - ABCDEs: Counseled ABCDEs of melanoma: Asymmetry, Border (irregularity), Color (not uniform, changes in color), Diameter (greater than 6 mm which is about the size of a pencil eraser), and Evolving (any changes in preexisting moles).  - Monitor: Patient to continue monitoring at home and will contact the clinic for any changes.  - Sun protection: Counseled SPF30+ sunscreen, UPF clothing, sun avoidance, tanning bed avoidance.  - Continue with annual skin exams     # Acrochordon, non irritated.   - NTD: No further management at this time.     # Scalp folliculitis with early prurigo nodule formation  -continue fluocinonide solution every day  -start clindamycin 1% solution once to twice daily  -avoid picking, scratching or otherwise traumatizing the lesions as this prevents healing     Procedures Performed:   None    Follow-up: 1 year(s) in-person, or earlier for new or changing lesions    Staff:     All risks, benefits and alternatives were discussed with patient.  Patient is in agreement and understands the assessment and plan.  All questions were answered.    Ibis Raymundo PA-C, MPAS  [facility]: Phone: [phone], Fax:  [phone]  [facility]: Phone: [phone],  Fax: [phone]  ____________________________________________    CC: Skin Check (Asa is here for a skin check. He has lesions on his neck. He also has skin tags around his eyes. )      HPI:  Mr. Jeancarlos Ureña is a 61 year old male who presents today as a new patient for a [facility] skin screening. Notes bumps on the back of the scalp. Using fluocinonide solution on scalp - using once per day now. Was using it 3-4x per day previously. Has had bumps on back of neck/scalp for better part of the year. Does see dermatology regularly, [facility]. Has had biopsies but nothing abnml. No fhx melanoma. No personal hx of skin cancer.     Patient is otherwise feeling well, without additional concerns.    Labs:  none    Physical Exam:  Vitals: There were no vitals taken for this visit.  SKIN: Full skin, which includes the head/face, both arms, chest, back, abdomen,both legs, genitalia and/or groin buttocks, digits and/or nails, was examined.   - posterior neck./occipital scalp -4-5 pink papules  - Multiple regular brown pigmented macules and papules are identified on the trunk and extremities, greater than 100 - signature nevus is very dark  -Berman's skin type III.   -There is(are) skin colored pedunculated papules on the face.   - No other lesions of concern on areas examined.     Medications:  Current Outpatient Medications   Medication     atorvastatin (LIPITOR) 20 MG tablet     diltiazem ER (TIAZAC) 300 MG 24 hr ER beaded capsule     fluocinonide (LIDEX) 0.05 % external solution     ibuprofen (ADVIL/MOTRIN) 200 MG tablet     irbesartan-hydrochlorothiazide (AVALIDE) 300-12.5 MG tablet     loratadine-pseudoePHEDrine (CLARITIN-D 12-HOUR) 5-120 MG 12 hr tablet     No current facility-administered medications for this visit.       Past Medical/Surgical History:   There is no problem list on file for this patient.    Past Medical  History:   Diagnosis Date     Coronary artery disease 15 years ago    Enlarged heart?     Diabetes (H) Five years ago?    Pre-diabetes     Gastroesophageal reflux disease 2 and 20 years ago    Difficulty swallowing     Hyperlipidemia 2-3 years ago    Medication and now low     Hypertension Twenty years ago?    Two pills each day - 135/75       CC Dr. Knowles on close of this encounter.

## 2021-06-17 NOTE — LETTER
"6/17/2021     RE: Jeancarlos Ureña  [address] [id number]     Dear Colleague,    Thank you for referring your patient, Jeancarlos Ureña, to the [facility] at [facility]. Please see a copy of my visit note below.     History and Physical Examination     SUBJECTIVE: Chief complaint: preventive health review.     Past Medical History:  1.  Hypertension  2.  Left ventricular hypertrophy.  3.  Dyslipidemia  4.  Impaired fasting glucose.  5.  BPH.  6.  History of GERD  7.  History of dysplastic nevus.  8.  Allergic rhinitis.  9.  Atopic dermatitis.  10.  History of nephrolithiasis, circa 2006; no recurrence.  11.  First-degree AV block.  12.  Status post vasectomy.  13.  History of trauma-related forearm and finger fractures.  14.  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     Adverse Drug Reactions: Penicillin (unknown reaction)     Current Medications:  Atorvastatin, 20 mg daily  Diltiazem extended release, 3 mg daily at bedtime  Fluocinonide, 0.05% solution, applied as needed to scalp  Ibuprofen, 200 mg, used as needed  Irbesartan/HCTZ, 300/12.5, 1 daily  Loratadine plus pseudoephedrine, 5/120, daily as needed     Habits:  Tobacco: Never  Alcohol: 0-1 (occasionally 2) servings per day  Caffeine: 2 servings of coffee per day  Street drugs: Infrequent use of oral THC \"gummies\", only at home and never before driving     Social History:  to Moraima and father of 2 sons: Liam, age 34, a [employer] alumnus and ski mountaineer racer, currently enrolled in graduate studies in kinesiology at the [employer] [place] and engaged to be  in September; and Jozef, age 28, a  who works for [employer] in [place], [place].  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  Away from work, he enjoys cycling, skiing, soccer, and sailing from his [facility] home.    Family History: Father is 89, with history of diabetes mellitus, hypertension, dyslipidemia, and osteoarthritis.  Mother is 89, with history of cataracts and macular degeneration.  Sons are in good health.  Only sibling is a sister 3 years his javier who is in good health; her daughters have hydrocephalus, and RSD, respectively.  Maternal grandmother  in her 90s.  Maternal grandfather  in his 80s.  Paternal grandfather  prematurely from coronary artery disease, as did 2 of his brothers.     Review of Systems: Long-standing daily frontal headaches, described as mild, dull, constant, and without associated symptoms; pain subsides within 30 minutes of use of a single dose of ibuprofen.  Long history of awakening approximately every 2 hours to urinate; symptoms are unchanged and previously were treated to BPH; he notes that he has little trouble falling back to sleep, but does state that he has a habit of eating at night, sometimes in response to a \"ravenous\" appetite home, typically following evening carbohydrate snacking.  Recent home blood pressure readings in the range of 135/75.  Following a 10-pound weight loss, approximately 18 months ago, he has noted steady weight gain and return to baseline.  Most recent tetanus (Td) vaccination was administered in 2017; Tdap was administered previously.  No history of zoster vaccination.  Covid (Moderna) vaccination series was completed in 2021.  Most recent colonoscopy was completed in 2018; 10-year follow-up was recommended at that time.  Remainder of complete review of systems was negative.     OBJECTIVE:     Vital signs: Height 70 inches.  Weight 190 pounds.  Blood pressure 133/78 on average of 3 automated readings.  Heart rate 50. " " Respiratory rate 16.  Temperature 90.8 degrees.  O2 saturation 98% on room air.  General: Alert, neatly dressed and groomed, in no acute distress.  HEENT: Atraumatic and normocephalic. Eyelids, pupils, and conjunctivae appeared normal. Lips, teeth and gums appear normal.  Oropharynx showed moist mucous membranes, without exudate or erythema.  Relatively \"crowded\" soft palate, with narrow airway.  Neck: Supple, without thyromegaly, mass, or bruit. No cervical or supraclavicular lymphadenopathy.  Relatively large neck circumference.  Back: No spinal or costovertebral angle tenderness.  Chest: Clear to auscultation and percussion. Normal respiratory effort.  Cardiovascular: No jugular venous distention. Regular rate and rhythm, normal S1, S2 without murmur.  Abdomen: Bowel sounds positive; soft, nontender, without rebound, guarding, hepatosplenomegaly or mass.  Extremities: No cyanosis or edema.  Genitalia: Normal male genitalia, without scrotal mass or hernia. No inguinal lymphadenopathy.  Rectal: Normal tone, with smooth, nontender, moderately enlarged prostate. No rectal mass.  Skin: Examination was deferred; full evaluation was completed earlier in day through dermatology clinic.  Neurologic: Cranial nerves II-XII were grossly intact. Sensory and motor examinations were normal. Normal gait.  Mini-cog score was 4/5; improved to 5/5 with minimal prompting.  Psychiatric: Alert and oriented ×3. Normal affect. Judgment and insight intact.  PHQ-2 score 0.  RAE-7 score 1.    Sodium 143, potassium 3.7, creatinine 0.71, alkaline phosphatase 72, ALT 37, cholesterol 125, HDL 68, LDL 48, triglycerides 47, cholesterol/HDL 1.  8, PSA 1.55, TSH 1.85, 25-hydroxy vitamin D 38, glucose 100, white blood cell count 5000, hemoglobin 14.3, platelets 180,000, screens for HIV and hepatitis C nonreactive, urinalysis notable only for pH 8.0.    EKG showed sinus bradycardia with first-degree AV block and voltage criteria for LVH.  " Spirometry showed an FEV1 of 3.20, with an FVC of 4.03; readings were 92% and 88% of predicted values, respectively.    DEXA showed low bone density, with most negative and valid T-score of -1.2 at the right femoral neck.  Body composition analysis showed 8.2% fat (82nd percentile): Body mass index was 27.3.     ASSESSMENT:  1.  Low bone density.  We discussed the implications of this finding, along with the importance of regular weight-bearing exercise; daily calcium intake of 1200 mg, preferably from dietary sources; and daily use of a 50 mcg vitamin D3 supplement.  Additional testing to screen for underlying causes arranged; further recommendations will be based on these results.    2.  Hypertension.  He reports consistently elevated readings despite use of 3 antihypertensive medications.  In the setting of snoring and narrow airway, I recommended sleep clinic evaluation to exclude apnea.  Renin and aldosterone levels will be checked to screen for primary hyperaldosteronism.  We reviewed non-pharmacologic measures to reduce blood pressure, including continued aerobic exercise, weight loss, avoidance of NSAID and pseudoephedrine use, and restricted use of alcohol.  He agrees to pursue further evaluation if home blood pressure readings remain greater than 120/80, despite adhering to these measures for 3-6 months.    3.  Headaches.  Chronic daily headaches, responsive to use of NSAID.  We discussed the possibility that these are actually related to chronic use of analgesics.  He agrees to avoid use of ibuprofen, naproxen, and acetaminophen, and to pursue further evaluation if his headaches persist beyond 3-4 weeks.  In the event of infrequent future headaches, he will use an acetaminophen/aspirin/caffeine preparation as needed.    4.  Overweight.  We discussed strategies to reduce weight with a focus on reducing body fat percentage.  He was challenged to increase his levels of subthreshold exercise, with  "incorporation of high-intensity interval training for time economy to allow additional time for subthreshold training.  He will be advised of usual guidelines regarding modification of diet.  He will discuss nocturnal eating at his sleep clinic appointment; we reviewed the importance of \"mindful\" eating, and other strategies to reduce calorie intake.    5.  Impaired fasting glucose.  Minimally elevated glucose level today, suggesting significant improvement from previous testing.  He was congratulated for his regimen of regular exercise, encouraged to reduce weight, and advised to follow an appropriate diet.    6.  Dyslipidemia.  Current LDL reading is below level that can be used in the AHA/ACC algorithm for risk estimation.  He is appropriately using a moderate-intensity statin.  We discussed the potential benefits and risks of aspirin, which he has tolerated previously.  We reviewed the symptoms of coronary ischemia and the importance of pursuing immediate evaluation if such symptoms are noted.    7.  Preventive care.  Colonoscopy was completed in 2018; 10-year follow-up was recommended at that time.  He was advised to proceed with recombinant zoster vaccination series at his convenience to bring his immunization status up-to-date.  We reviewed the elements of a healthful diet and strategies to reduce weight.  He was congratulated for his regimen of regular exercise.    PLAN: See above.     ~SRT    Answers for HPI/ROS submitted by the patient on 6/8/2021   RAE 7 TOTAL SCORE: 1  General Symptoms: No  Skin Symptoms: No  HENT Symptoms: No  EYE SYMPTOMS: No  HEART SYMPTOMS: No  LUNG SYMPTOMS: No  INTESTINAL SYMPTOMS: No  URINARY SYMPTOMS: No  REPRODUCTIVE SYMPTOMS: No  SKELETAL SYMPTOMS: No  BLOOD SYMPTOMS: No  NERVOUS SYSTEM SYMPTOMS: No  MENTAL HEALTH SYMPTOMS: No  Ceasar Leung MD  "

## 2021-06-17 NOTE — LETTER
"6/17/2021      RE: Jeancarlos Ureña  [address] [facility] [address]        History and Physical Examination     SUBJECTIVE: Chief complaint: preventive health review.     Past Medical History:  1.  Hypertension  2.  Left ventricular hypertrophy.  3.  Dyslipidemia  4.  Impaired fasting glucose.  5.  BPH.  6.  History of GERD  7.  History of dysplastic nevus.  8.  Allergic rhinitis.  9.  Atopic dermatitis.  10.  History of nephrolithiasis, circa 2006; no recurrence.  11.  First-degree AV block.  12.  Status post vasectomy.  13.  History of trauma-related forearm and finger fractures.  14.  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     Adverse Drug Reactions: Penicillin (unknown reaction)     Current Medications:  Atorvastatin, 20 mg daily  Diltiazem extended release, 3 mg daily at bedtime  Fluocinonide, 0.05% solution, applied as needed to scalp  Ibuprofen, 200 mg, used as needed  Irbesartan/HCTZ, 300/12.5, 1 daily  Loratadine plus pseudoephedrine, 5/120, daily as needed     Habits:  Tobacco: Never  Alcohol: 0-1 (occasionally 2) servings per day  Caffeine: 2 servings of coffee per day  Street drugs: Infrequent use of oral THC \"gummies\", only at home and never before driving     Social History:  to Moraima and father of 2 sons: Liam, age 34, a [employer] alumnus and ski mountaineer racer, currently enrolled in graduate studies in kinesiology at the [employer] and engaged to be  in September; and Jozef, age 28, a  who works for [employer] in [place], [place].  Asa is a native of [place] who attended the [employer], where he studied architecture.  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  Away from work, he enjoys cycling, skiing, soccer, and sailing from his [facility] home.    Family History: Father " "is 89, with history of diabetes mellitus, hypertension, dyslipidemia, and osteoarthritis.  Mother is 89, with history of cataracts and macular degeneration.  Sons are in good health.  Only sibling is a sister 3 years his javier who is in good health; her daughters have hydrocephalus, and RSD, respectively.  Maternal grandmother  in her 90s.  Maternal grandfather  in his 80s.  Paternal grandfather  prematurely from coronary artery disease, as did 2 of his brothers.     Review of Systems: Long-standing daily frontal headaches, described as mild, dull, constant, and without associated symptoms; pain subsides within 30 minutes of use of a single dose of ibuprofen.  Long history of awakening approximately every 2 hours to urinate; symptoms are unchanged and previously were treated to BPH; he notes that he has little trouble falling back to sleep, but does state that he has a habit of eating at night, sometimes in response to a \"ravenous\" appetite home, typically following evening carbohydrate snacking.  Recent home blood pressure readings in the range of 135/75.  Following a 10-pound weight loss, approximately 18 months ago, he has noted steady weight gain and return to baseline.  Most recent tetanus (Td) vaccination was administered in 2017; Tdap was administered previously.  No history of zoster vaccination.  Covid (Moderna) vaccination series was completed in 2021.  Most recent colonoscopy was completed in 2018; 10-year follow-up was recommended at that time.  Remainder of complete review of systems was negative.     OBJECTIVE:     Vital signs: Height 70 inches.  Weight 190 pounds.  Blood pressure 133/78 on average of 3 automated readings.  Heart rate 50.  Respiratory rate 16.  Temperature 90.8 degrees.  O2 saturation 98% on room air.  General: Alert, neatly dressed and groomed, in no acute distress.  HEENT: Atraumatic and normocephalic. Eyelids, pupils, and conjunctivae appeared normal. Lips, teeth " "and gums appear normal.  Oropharynx showed moist mucous membranes, without exudate or erythema.  Relatively \"crowded\" soft palate, with narrow airway.  Neck: Supple, without thyromegaly, mass, or bruit. No cervical or supraclavicular lymphadenopathy.  Relatively large neck circumference.  Back: No spinal or costovertebral angle tenderness.  Chest: Clear to auscultation and percussion. Normal respiratory effort.  Cardiovascular: No jugular venous distention. Regular rate and rhythm, normal S1, S2 without murmur.  Abdomen: Bowel sounds positive; soft, nontender, without rebound, guarding, hepatosplenomegaly or mass.  Extremities: No cyanosis or edema.  Genitalia: Normal male genitalia, without scrotal mass or hernia. No inguinal lymphadenopathy.  Rectal: Normal tone, with smooth, nontender, moderately enlarged prostate. No rectal mass.  Skin: Examination was deferred; full evaluation was completed earlier in day through dermatology clinic.  Neurologic: Cranial nerves II-XII were grossly intact. Sensory and motor examinations were normal. Normal gait.  Mini-cog score was 4/5; improved to 5/5 with minimal prompting.  Psychiatric: Alert and oriented ×3. Normal affect. Judgment and insight intact.  PHQ-2 score 0.  RAE-7 score 1.    Sodium 143, potassium 3.7, creatinine 0.71, alkaline phosphatase 72, ALT 37, cholesterol 125, HDL 68, LDL 48, triglycerides 47, cholesterol/HDL 1.  8, PSA 1.55, TSH 1.85, 25-hydroxy vitamin D 38, glucose 100, white blood cell count 5000, hemoglobin 14.3, platelets 180,000, screens for HIV and hepatitis C nonreactive, urinalysis notable only for pH 8.0.    EKG showed sinus bradycardia with first-degree AV block and voltage criteria for LVH.  Spirometry showed an FEV1 of 3.20, with an FVC of 4.03; readings were 92% and 88% of predicted values, respectively.    DEXA showed low bone density, with most negative and valid T-score of -1.2 at the right femoral neck.  Body composition analysis showed " 8.2% fat (82nd percentile): Body mass index was 27.3.     ASSESSMENT:      1.  Low bone density.  We discussed the implications of this finding, along with the importance of regular weight-bearing exercise; daily calcium intake of 1200 mg, preferably from dietary sources; and daily use of a 50 mcg vitamin D3 supplement.  Additional testing to screen for underlying causes arranged; further recommendations will be based on these results.    2.  Hypertension.  He reports consistently elevated readings despite use of 3 antihypertensive medications.  In the setting of snoring and narrow airway, I recommended sleep clinic evaluation to exclude apnea.  Renin and aldosterone levels will be checked to screen for primary hyperaldosteronism.  We reviewed non-pharmacologic measures to reduce blood pressure, including continued aerobic exercise, weight loss, avoidance of NSAID and pseudoephedrine use, and restricted use of alcohol.  He agrees to pursue further evaluation if home blood pressure readings remain greater than 120/80, despite adhering to these measures for 3-6 months.    3.  Headaches.  Chronic daily headaches, responsive to use of NSAID.  We discussed the possibility that these are actually related to chronic use of analgesics.  He agrees to avoid use of ibuprofen, naproxen, and acetaminophen, and to pursue further evaluation if his headaches persist beyond 3-4 weeks.  In the event of infrequent future headaches, he will use an acetaminophen/aspirin/caffeine preparation as needed.    4.  Overweight.  We discussed strategies to reduce weight with a focus on reducing body fat percentage.  He was challenged to increase his levels of subthreshold exercise, with incorporation of high-intensity interval training for time economy to allow additional time for subthreshold training.  He will be advised of usual guidelines regarding modification of diet.  He will discuss nocturnal eating at his sleep clinic appointment; we  "reviewed the importance of \"mindful\" eating, and other strategies to reduce calorie intake.    5.  Impaired fasting glucose.  Minimally elevated glucose level today, suggesting significant improvement from previous testing.  He was congratulated for his regimen of regular exercise, encouraged to reduce weight, and advised to follow an appropriate diet.    6.  Dyslipidemia.  Current LDL reading is below level that can be used in the AHA/ACC algorithm for risk estimation.  He is appropriately using a moderate-intensity statin.  We discussed the potential benefits and risks of aspirin, which he has tolerated previously.  We reviewed the symptoms of coronary ischemia and the importance of pursuing immediate evaluation if such symptoms are noted.    7.  Preventive care.  Colonoscopy was completed in 2018; 10-year follow-up was recommended at that time.  He was advised to proceed with recombinant zoster vaccination series at his convenience to bring his immunization status up-to-date.  We reviewed the elements of a healthful diet and strategies to reduce weight.  He was congratulated for his regimen of regular exercise.    PLAN: See above.     ~SRT    Answers for HPI/ROS submitted by the patient on 6/8/2021   RAE 7 TOTAL SCORE: 1  General Symptoms: No  Skin Symptoms: No  HENT Symptoms: No  EYE SYMPTOMS: No  HEART SYMPTOMS: No  LUNG SYMPTOMS: No  INTESTINAL SYMPTOMS: No  URINARY SYMPTOMS: No  REPRODUCTIVE SYMPTOMS: No  SKELETAL SYMPTOMS: No  BLOOD SYMPTOMS: No  NERVOUS SYSTEM SYMPTOMS: No  MENTAL HEALTH SYMPTOMS: No        Ceasar Leung MD      "

## 2021-06-17 NOTE — NURSING NOTE
Dermatology Rooming Note    Jeancarlos Ureña's goals for this visit include:   Chief Complaint   Patient presents with     Skin Check     Asa is here for a skin check. He has lesions on his neck. He also has skin tags around his eyes.      Aurora Mg CMA

## 2021-06-17 NOTE — PROGRESS NOTES
Jeancarlos Ureña comes into clinic today at the request of Dr. ARVIN Leung Ordering Provider for EKG.    This service provided today was under the supervising provider of the day Dr. ARVIN Leung, who was available if needed.    Carly Latif, [facility]

## 2021-06-17 NOTE — NURSING NOTE
AHA BP  1st     134/78  2nd   135/78  3rd    130/75    Average   133/78  Carly Latif CMA 7:35 AM on 6/17/2021

## 2021-06-17 NOTE — PROGRESS NOTES
" History and Physical Examination     SUBJECTIVE: Chief complaint: preventive health review.     Past Medical History:  1.  Hypertension  2.  Left ventricular hypertrophy.  3.  Dyslipidemia  4.  Impaired fasting glucose.  5.  BPH.  6.  History of GERD  7.  History of dysplastic nevus.  8.  Allergic rhinitis.  9.  Atopic dermatitis.  10.  History of nephrolithiasis, circa 2006; no recurrence.  11.  First-degree AV block.  12.  Status post vasectomy.  13.  History of trauma-related forearm and finger fractures.  14.  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     Adverse Drug Reactions: Penicillin (unknown reaction)     Current Medications:  Atorvastatin, 20 mg daily  Diltiazem extended release, 3 mg daily at bedtime  Fluocinonide, 0.05% solution, applied as needed to scalp  Ibuprofen, 200 mg, used as needed  Irbesartan/HCTZ, 300/12.5, 1 daily  Loratadine plus pseudoephedrine, 5/120, daily as needed     Habits:  Tobacco: Never  Alcohol: 0-1 (occasionally 2) servings per day  Caffeine: 2 servings of coffee per day  Street drugs: Infrequent use of oral THC \"gummies\", only at home and never before driving     Social History:  to Moraima and father of 2 sons: Liam, age 34, a [employer] alumnus and ski mountaineer racer, currently enrolled in graduate studies in kinesiology at the [employer] and engaged to be  in September; and Jozef, age 28, a  who works for [employer] in [place], [place].  Asa is a native of [place] who attended the [employer], where he studied architecture.  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  Away from work, he enjoys cycling, skiing, soccer, and sailing from his [place] home.    Family History: Father is 89, with history of diabetes mellitus, hypertension, dyslipidemia, and " "osteoarthritis.  Mother is 89, with history of cataracts and macular degeneration.  Sons are in good health.  Only sibling is a sister 3 years his javier who is in good health; her daughters have hydrocephalus, and RSD, respectively.  Maternal grandmother  in her 90s.  Maternal grandfather  in his 80s.  Paternal grandfather  prematurely from coronary artery disease, as did 2 of his brothers.     Review of Systems: Long-standing daily frontal headaches, described as mild, dull, constant, and without associated symptoms; pain subsides within 30 minutes of use of a single dose of ibuprofen.  Long history of awakening approximately every 2 hours to urinate; symptoms are unchanged and previously were treated to BPH; he notes that he has little trouble falling back to sleep, but does state that he has a habit of eating at night, sometimes in response to a \"ravenous\" appetite home, typically following evening carbohydrate snacking.  Recent home blood pressure readings in the range of 135/75.  Following a 10-pound weight loss, approximately 18 months ago, he has noted steady weight gain and return to baseline.  Most recent tetanus (Td) vaccination was administered in 2017; Tdap was administered previously.  No history of zoster vaccination.  Covid (Moderna) vaccination series was completed in 2021.  Most recent colonoscopy was completed in 2018; 10-year follow-up was recommended at that time.  Remainder of complete review of systems was negative.     OBJECTIVE:     Vital signs: Height 70 inches.  Weight 190 pounds.  Blood pressure 133/78 on average of 3 automated readings.  Heart rate 50.  Respiratory rate 16.  Temperature 90.8 degrees.  O2 saturation 98% on room air.  General: Alert, neatly dressed and groomed, in no acute distress.  HEENT: Atraumatic and normocephalic. Eyelids, pupils, and conjunctivae appeared normal. Lips, teeth and gums appear normal.  Oropharynx showed moist mucous membranes, " "without exudate or erythema.  Relatively \"crowded\" soft palate, with narrow airway.  Neck: Supple, without thyromegaly, mass, or bruit. No cervical or supraclavicular lymphadenopathy.  Relatively large neck circumference.  Back: No spinal or costovertebral angle tenderness.  Chest: Clear to auscultation and percussion. Normal respiratory effort.  Cardiovascular: No jugular venous distention. Regular rate and rhythm, normal S1, S2 without murmur.  Abdomen: Bowel sounds positive; soft, nontender, without rebound, guarding, hepatosplenomegaly or mass.  Extremities: No cyanosis or edema.  Genitalia: Normal male genitalia, without scrotal mass or hernia. No inguinal lymphadenopathy.  Rectal: Normal tone, with smooth, nontender, moderately enlarged prostate. No rectal mass.  Skin: Examination was deferred; full evaluation was completed earlier in day through dermatology clinic.  Neurologic: Cranial nerves II-XII were grossly intact. Sensory and motor examinations were normal. Normal gait.  Mini-cog score was 4/5; improved to 5/5 with minimal prompting.  Psychiatric: Alert and oriented ×3. Normal affect. Judgment and insight intact.  PHQ-2 score 0.  RAE-7 score 1.    Sodium 143, potassium 3.7, creatinine 0.71, alkaline phosphatase 72, ALT 37, cholesterol 125, HDL 68, LDL 48, triglycerides 47, cholesterol/HDL 1.  8, PSA 1.55, TSH 1.85, 25-hydroxy vitamin D 38, glucose 100, white blood cell count 5000, hemoglobin 14.3, platelets 180,000, screens for HIV and hepatitis C nonreactive, urinalysis notable only for pH 8.0.    EKG showed sinus bradycardia with first-degree AV block and voltage criteria for LVH.  Spirometry showed an FEV1 of 3.20, with an FVC of 4.03; readings were 92% and 88% of predicted values, respectively.    DEXA showed low bone density, with most negative and valid T-score of -1.2 at the right femoral neck.  Body composition analysis showed 8.2% fat (82nd percentile): Body mass index was 27.3.   " "  ASSESSMENT:      1.  Low bone density.  We discussed the implications of this finding, along with the importance of regular weight-bearing exercise; daily calcium intake of 1200 mg, preferably from dietary sources; and daily use of a 50 mcg vitamin D3 supplement.  Additional testing to screen for underlying causes arranged; further recommendations will be based on these results.    2.  Hypertension.  He reports consistently elevated readings despite use of 3 antihypertensive medications.  In the setting of snoring and narrow airway, I recommended sleep clinic evaluation to exclude apnea.  Renin and aldosterone levels will be checked to screen for primary hyperaldosteronism.  We reviewed non-pharmacologic measures to reduce blood pressure, including continued aerobic exercise, weight loss, avoidance of NSAID and pseudoephedrine use, and restricted use of alcohol.  He agrees to pursue further evaluation if home blood pressure readings remain greater than 120/80, despite adhering to these measures for 3-6 months.    3.  Headaches.  Chronic daily headaches, responsive to use of NSAID.  We discussed the possibility that these are actually related to chronic use of analgesics.  He agrees to avoid use of ibuprofen, naproxen, and acetaminophen, and to pursue further evaluation if his headaches persist beyond 3-4 weeks.  In the event of infrequent future headaches, he will use an acetaminophen/aspirin/caffeine preparation as needed.    4.  Overweight.  We discussed strategies to reduce weight with a focus on reducing body fat percentage.  He was challenged to increase his levels of subthreshold exercise, with incorporation of high-intensity interval training for time economy to allow additional time for subthreshold training.  He will be advised of usual guidelines regarding modification of diet.  He will discuss nocturnal eating at his sleep clinic appointment; we reviewed the importance of \"mindful\" eating, and other " strategies to reduce calorie intake.    5.  Impaired fasting glucose.  Minimally elevated glucose level today, suggesting significant improvement from previous testing.  He was congratulated for his regimen of regular exercise, encouraged to reduce weight, and advised to follow an appropriate diet.    6.  Dyslipidemia.  Current LDL reading is below level that can be used in the AHA/ACC algorithm for risk estimation.  He is appropriately using a moderate-intensity statin.  We discussed the potential benefits and risks of aspirin, which he has tolerated previously.  We reviewed the symptoms of coronary ischemia and the importance of pursuing immediate evaluation if such symptoms are noted.    7.  Preventive care.  Colonoscopy was completed in 2018; 10-year follow-up was recommended at that time.  He was advised to proceed with recombinant zoster vaccination series at his convenience to bring his immunization status up-to-date.  We reviewed the elements of a healthful diet and strategies to reduce weight.  He was congratulated for his regimen of regular exercise.    PLAN: See above.     ~SRT    Answers for HPI/ROS submitted by the patient on 6/8/2021   RAE 7 TOTAL SCORE: 1  General Symptoms: No  Skin Symptoms: No  HENT Symptoms: No  EYE SYMPTOMS: No  HEART SYMPTOMS: No  LUNG SYMPTOMS: No  INTESTINAL SYMPTOMS: No  URINARY SYMPTOMS: No  REPRODUCTIVE SYMPTOMS: No  SKELETAL SYMPTOMS: No  BLOOD SYMPTOMS: No  NERVOUS SYSTEM SYMPTOMS: No  MENTAL HEALTH SYMPTOMS: No

## 2021-06-17 NOTE — LETTER
"    6/17/2021         RE: Jeancarlos Ureña  [address] [address]        Dear Colleague,    Thank you for referring your patient, Jeancarlos Ureña, to the [facility] [place]. Please see a copy of my visit note below.    [facility] Outpatient Medical Nutrition Therapy      Time Spent:  60 minutes  Session Type:  Initial   Referral Source: [facility] Package/Dr. Ceasar Leung  Reason for RD Visit:   Nutritional counseling     Nutrition Assessment:  Patient (Asa) is here for initial annual visit with Registered Dietitian (DANISH).  Asa is a 61 y.o. male with history that includes GERD, hypertension, hyperlipidemia, coronary artery disease, diabetes/prediabetes (noted in EMR). He stated that he is interested in losing some weight. Overall, he feels that his diet is a healthy one but has not previously met with a dietitian. Additionally, he has made some changes to his diet a couple of weeks ago. For many years he had a bowl of cereal for breakfast but 2 weeks ago started having greek yogurt and granola for breakfast. Also over the past 20-30 years, he will sometimes get up and eat in the middle of the night. He believes he might be hungry but uncertain if this is just a habit. If he take one sip of diet sprite, this usually satisfies him and he goes right back to sleep. He is also currently reading a book about changing habits. Additionally, he stated that he used to salt all of his food right away without tasting it first for many years, but discontinued doing this. He was wondering the benefits of discontinuing added salt to foods    Estimated body mass index is 27.26 kg/m  as calculated from the following:    Height as of an earlier encounter on 6/17/21: 1.778 m (5' 10\").    Weight as of an earlier encounter on 6/17/21: 86.2 kg (190 lb).    Diet Recall:  (Some usual meals, snacks and beverages):  Meal Food    Breakfast Greek yogurt with granola " OR previously for many years bowl of cereal   Lunch 2-3 oz cheese, ~6 triscuits, 1/2-1 whole avocado, yogurt and if he has some around additionally will have some salmon and/or HB egg, or sometimes dinner leftovers   Dinner Fish/red meat or other meat, salad, fruit or vegetarian dish with salad and fruit (red meat 1-2x/week, other meat 1-2 x/week, fish 1-2x/week and vegetarian main course 1-2x/week)   Snacks Either none or has a chewy Jew granola bar while biking/exercising for awhile. If gets up in middle of night to eat then cheese and crackers.   Beverages 32 oz water, 2 coffees (am and afternoon, might be iced in summer in afternoon), 1x/week has a latte made with skim milk. Rare juice or tea.   Alcohol Intake 1 beer every day after dinner (between 8-9 pm), occasionally has a white Finnish too (estimated ~2 per week).     Frequency of eating/taking out meals: ~1x/week usually has fish, veg and rice or potato when eating out.     Labs:  Reviewed EMR.    Pertinent Medications/vitamin and mineral supplements:     Current Outpatient Medications   Medication     atorvastatin (LIPITOR) 20 MG tablet     diltiazem ER (TIAZAC) 300 MG 24 hr ER beaded capsule     fluocinonide (LIDEX) 0.05 % external solution     ibuprofen (ADVIL/MOTRIN) 200 MG tablet     irbesartan-hydrochlorothiazide (AVALIDE) 300-12.5 MG tablet     loratadine-pseudoePHEDrine (CLARITIN-D 12-HOUR) 5-120 MG 12 hr tablet     No current facility-administered medications for this visit.      Food Allergies:  NKFA     Physical Activity:  6-7 times per week exercises for about 2-3 hours. Road and mountain biking, canoeing, standing board, kettlebells (1-2x/week for 45 minutes). In winter cross country skiing and snowshoeing and biking on snow.    Nutrition Diagnosis:    Food and nutrition related knowledge deficit related to lack of previous diet education as evidenced by pt report and interest in diet education.    Nutrition Prescription: Recommended general  "healthful diet/The Plate method    Nutrition Intervention:    Nutrition Education/Counseling:  -Provided general healthful diet nutrition education with tips and suggestions.   -Reviewed the Plate method meal plan with food groups and some example foods in groups.   --Discussed some specific tips and suggestion for foods he can add into meals to help get balanced meals and those based on his food preferences.  -Discussed general sodium recommendations of getting less than 2300 mg/day. Discussed some low sodium diet tips. Answered his question about the issue with too much sodium in diet especially as it relates to heart health and high blood pressure.  -Reviewed the difference between unsaturated and saturated fats with recommendation to aim for choosing unsaturated fat over saturated fats and discussed examples of both.  -Discussed adequate hydration/recommendations and encouraged pt to drink at least 64 oz (8 cups) per day.   --discussed other weight management tips and suggestions. Recommended eating slowing, stopping when satisfied, limiting alcoholic beverages, limit snack an okay for planned snack if going a long stretch between meals.    Answered patient's questions. Patient verbalized understanding of education provided. Provided pt with RD contact information and list of goals below.     Educational Materials Provided:   Health Daily Nutrition Plate method handout     Goals:    1. Keep daily food and beverage journals. Can use an rober such as \"Lose It\" or \"My Fitness Pal\" or in a notebook or other rober that you prefer.    2. Eat 3 balanced meals per day. Can use the Plate Method meal plan for general guidance on getting balanced meals.  --Use a smaller plate  --Make half your plate vegetables and fruit  --1/4 of your plate (~1 cup or less, whole grain starches).  --The other 1/4 of your plate (~3 oz/deck of cards size or 3/4 cup) of a lean protein food.  --Include a couple of healthy (more unsaturated fat) " servings at a meal (for example 2T avocado or 1 tsp olive oil or 6 nuts or 1T nut butter = 1 fat serving).    3. If going longer than 4-6 hours between meals, such as you do between lunch and dinner meal, add in a healthy snacks (~100 calories).    4. Limit alcoholic beverages to 1 standard drink per day or less (1 standard drink= 12 oz beer, 5 oz wine or 1.5 oz hard liquor).    5. Increase water to drink at least 64 oz (8 cups) per day.    6. Some ideas for your usual meal to make them more balanced include the following:  --Add in a fruit serving at breakfast and choose a low sugar, high protein granola.  --Add in some vegetables at lunch meal.  --Continue eating balanced dinner meal, but see if one plate is enough. If you still feel hungry after eating, can try having some additional vegetables and/or 1 oz of protein foods for example.  --If you will go a long stretch between lunch and dinner (longer than 4-5 hours), then add in a healthy planned snack at that time.    7. Eat slowly and take at least 20-30 minutes to eat a meal. This will give you time to feel more satisfied.    8. Avoid distractions while eating (such as working, tv, using the computer, using the phone).    Nutrition Monitoring and Evaluation: Will monitor adherence to nutrition recommendations at future RD visits.     Further Medical Nutrition Therapy:  Annual visits  Patient was encouraged to call/contact RD with any further questions.    Connie Reynolds, MS, RD, LD            Again, thank you for allowing me to participate in the care of your patient.        Sincerely,        Connie Reynolds RD

## 2021-06-17 NOTE — PROGRESS NOTES
AUDIOLOGY REPORT    SUMMARY: [facility] audiologic assessment completed. See audiogram for results.      RECOMMENDATIONS: Repeat audiologic assessment if changes are noted.    Jamie Cameron, [facility]  Licensed Audiologist  [place] #[id number]

## 2021-06-18 LAB
ALBUMIN SERPL ELPH-MCNC: 4 G/DL (ref 3.7–5.1)
ALDOST SERPL-MCNC: 11.5 NG/DL (ref 0–31)
ALPHA1 GLOB SERPL ELPH-MCNC: 0.2 G/DL (ref 0.2–0.4)
ALPHA2 GLOB SERPL ELPH-MCNC: 0.6 G/DL (ref 0.5–0.9)
B-GLOBULIN SERPL ELPH-MCNC: 1 G/DL (ref 0.6–1)
GAMMA GLOB SERPL ELPH-MCNC: 0.6 G/DL (ref 0.7–1.6)
INTERPRETATION ECG - MUSE: NORMAL
M PROTEIN SERPL ELPH-MCNC: 0.1 G/DL
PROT PATTERN SERPL ELPH-IMP: ABNORMAL

## 2021-06-20 LAB
EXPTIME-PRE: 7.11 SEC
FEF2575-%PRED-PRE: 105 %
FEF2575-PRE: 2.96 L/SEC
FEF2575-PRED: 2.82 L/SEC
FEFMAX-%PRED-PRE: 110 %
FEFMAX-PRE: 9.91 L/SEC
FEFMAX-PRED: 8.99 L/SEC
FEV1-%PRED-PRE: 92 %
FEV1-PRE: 3.2 L
FEV1FEV6-PRE: 80 %
FEV1FEV6-PRED: 79 %
FEV1FVC-PRE: 80 %
FEV1FVC-PRED: 77 %
FIFMAX-PRE: 6.91 L/SEC
FVC-%PRED-PRE: 88 %
FVC-PRE: 4.03 L
FVC-PRED: 4.52 L

## 2021-06-21 LAB — RENIN PLAS-CCNC: 2.4 NG/ML/H

## 2021-06-22 LAB — TESTOST SERPL-MCNC: 363 NG/DL (ref 240–950)

## 2021-06-24 DIAGNOSIS — D47.2 MONOCLONAL PARAPROTEINEMIA: Primary | ICD-10-CM

## 2021-07-16 ENCOUNTER — LAB (OUTPATIENT)
Dept: LAB | Facility: CLINIC | Age: 61
End: 2021-07-16
Payer: COMMERCIAL

## 2021-07-16 DIAGNOSIS — D47.2 MONOCLONAL PARAPROTEINEMIA: ICD-10-CM

## 2021-07-16 DIAGNOSIS — M85.9 LOW BONE DENSITY: ICD-10-CM

## 2021-07-16 LAB
CALCIUM 24H UR-MRATE: 0.31 G/SPEC
CALCIUM UR-MCNC: 13.6 MG/DL
CALCIUM/CREAT UR: 0.18 G/G CR
COLLECT DURATION TIME UR: 24 H
CREAT 24H UR-MRATE: 1.71 G/SPEC (ref 1–2)
CREAT UR-MCNC: 74 MG/DL
SPECIMEN VOL UR: 2305 ML

## 2021-07-16 PROCEDURE — 81050 URINALYSIS VOLUME MEASURE: CPT | Performed by: PATHOLOGY

## 2021-07-16 PROCEDURE — 82784 ASSAY IGA/IGD/IGG/IGM EACH: CPT | Mod: 90 | Performed by: PATHOLOGY

## 2021-07-16 PROCEDURE — 82340 ASSAY OF CALCIUM IN URINE: CPT | Performed by: PATHOLOGY

## 2021-07-16 PROCEDURE — 86334 IMMUNOFIX E-PHORESIS SERUM: CPT | Mod: 26 | Performed by: PATHOLOGY

## 2021-07-16 PROCEDURE — 86335 IMMUNFIX E-PHORSIS/URINE/CSF: CPT | Mod: 26 | Performed by: PATHOLOGY

## 2021-07-16 PROCEDURE — 36415 COLL VENOUS BLD VENIPUNCTURE: CPT | Performed by: PATHOLOGY

## 2021-07-19 LAB
IGA SERPL-MCNC: 445 MG/DL (ref 84–499)
IGG SERPL-MCNC: 693 MG/DL (ref 610–1616)
IGM SERPL-MCNC: 47 MG/DL (ref 35–242)
PROT ELPH PNL UR ELPH: NORMAL
PROT PATTERN SERPL IFE-IMP: NORMAL

## 2021-07-20 DIAGNOSIS — D47.2 MONOCLONAL PARAPROTEINEMIA: Primary | ICD-10-CM

## 2021-08-09 ENCOUNTER — MYC MEDICAL ADVICE (OUTPATIENT)
Dept: SLEEP MEDICINE | Facility: CLINIC | Age: 61
End: 2021-08-09

## 2021-08-09 ASSESSMENT — SLEEP AND FATIGUE QUESTIONNAIRES
HOW LIKELY ARE YOU TO NOD OFF OR FALL ASLEEP WHILE SITTING AND READING: SLIGHT CHANCE OF DOZING
HOW LIKELY ARE YOU TO NOD OFF OR FALL ASLEEP IN A CAR, WHILE STOPPED FOR A FEW MINUTES IN TRAFFIC: WOULD NEVER DOZE
HOW LIKELY ARE YOU TO NOD OFF OR FALL ASLEEP WHILE WATCHING TV: WOULD NEVER DOZE
HOW LIKELY ARE YOU TO NOD OFF OR FALL ASLEEP WHILE SITTING QUIETLY AFTER LUNCH WITHOUT ALCOHOL: WOULD NEVER DOZE
HOW LIKELY ARE YOU TO NOD OFF OR FALL ASLEEP WHEN YOU ARE A PASSENGER IN A CAR FOR AN HOUR WITHOUT A BREAK: SLIGHT CHANCE OF DOZING
HOW LIKELY ARE YOU TO NOD OFF OR FALL ASLEEP WHILE SITTING INACTIVE IN A PUBLIC PLACE: WOULD NEVER DOZE
HOW LIKELY ARE YOU TO NOD OFF OR FALL ASLEEP WHILE LYING DOWN TO REST IN THE AFTERNOON WHEN CIRCUMSTANCES PERMIT: MODERATE CHANCE OF DOZING
HOW LIKELY ARE YOU TO NOD OFF OR FALL ASLEEP WHILE SITTING AND TALKING TO SOMEONE: WOULD NEVER DOZE

## 2021-08-10 ENCOUNTER — VIRTUAL VISIT (OUTPATIENT)
Dept: SLEEP MEDICINE | Facility: CLINIC | Age: 61
End: 2021-08-10
Attending: INTERNAL MEDICINE
Payer: COMMERCIAL

## 2021-08-10 VITALS — WEIGHT: 186 LBS | HEIGHT: 70 IN | BODY MASS INDEX: 26.63 KG/M2

## 2021-08-10 DIAGNOSIS — R06.83 SNORING: ICD-10-CM

## 2021-08-10 DIAGNOSIS — G47.00 FREQUENT NOCTURNAL AWAKENING: ICD-10-CM

## 2021-08-10 DIAGNOSIS — R06.81 WITNESSED EPISODE OF APNEA: Primary | ICD-10-CM

## 2021-08-10 PROCEDURE — 99203 OFFICE O/P NEW LOW 30 MIN: CPT | Mod: 95 | Performed by: INTERNAL MEDICINE

## 2021-08-10 ASSESSMENT — MIFFLIN-ST. JEOR: SCORE: 1644.94

## 2021-08-10 NOTE — PATIENT INSTRUCTIONS
Your BMI is Body mass index is 26.69 kg/m .  Weight management is a personal decision.  If you are interested in exploring weight loss strategies, the following discussion covers the approaches that may be successful. Body mass index (BMI) is one way to tell whether you are at a healthy weight, overweight, or obese. It measures your weight in relation to your height.  A BMI of 18.5 to 24.9 is in the healthy range. A person with a BMI of 25 to 29.9 is considered overweight, and someone with a BMI of 30 or greater is considered obese. More than two-thirds of American adults are considered overweight or obese.  Being overweight or obese increases the risk for further weight gain. Excess weight may lead to heart disease and diabetes.  Creating and following plans for healthy eating and physical activity may help you improve your health.  Weight control is part of healthy lifestyle and includes exercise, emotional health, and healthy eating habits. Careful eating habits lifelong are the mainstay of weight control. Though there are significant health benefits from weight loss, long-term weight loss with diet alone may be very difficult to achieve- studies show long-term success with dietary management in less than 10% of people. Attaining a healthy weight may be especially difficult to achieve in those with severe obesity. In some cases, medications, devices and surgical management might be considered.  What can you do?  If you are overweight or obese and are interested in methods for weight loss, you should discuss this with your provider.     Consider reducing daily calorie intake by 500 calories.     Keep a food journal.     Avoiding skipping meals, consider cutting portions instead.    Diet combined with exercise helps maintain muscle while optimizing fat loss. Strength training is particularly important for building and maintaining muscle mass. Exercise helps reduce stress, increase energy, and improves fitness.  Increasing exercise without diet control, however, may not burn enough calories to loose weight.       Start walking three days a week 10-20 minutes at a time    Work towards walking thirty minutes five days a week     Eventually, increase the speed of your walking for 1-2 minutes at time    In addition, we recommend that you review healthy lifestyles and methods for weight loss available through the National Institutes of Health patient information sites:  http://win.niddk.nih.gov/publications/index.htm    And look into health and wellness programs that may be available through your health insurance provider, employer, local community center, or korin club.    What is a Home Sleep Study?    your doctor can give you a portable sleep monitor to use at home, so you don t have to spend the night in the sleep lab. But you should use a portable monitor only if:   ?Your doctor thinks you have a condition that makes you stop breathing for short periods while you are asleep, called  sleep apnea.    ?You do not have other serious medical problems, such as heart disease or lung disease.    Please bring the home sleep study device back to the sleep center as soon as you are finished with it so we can score it.     The cost of care estimate line is [phone]. They are able to give the patient an estimate of the charges and also an estimate of their insurance coverage/patient responsibility.   After your sleep study is performed, please call us at [phone] or [phone] to schedule for a follow up to review the results of the sleep study.    Consider using one tab of low dose melatonin 3 mg or less on the night of the study.    It is completely voluntary.    Do not drive or operate machinery after intake of melatonin.     Due to the pandemic, we have many people waiting in line for sleep studies- this wait list is improving each week.   You should receive a call within 2 weeks from our staff to schedule you for  your  test.   Depending on the delay in approval by your insurance carrier, the study will be completed within a few days to 2 weeks of that call.

## 2021-08-10 NOTE — PROGRESS NOTES
Asa is a 61 year old who is being evaluated via a billable video visit.      How would you like to obtain your AVS? MyChart  If the video visit is dropped, the invitation should be resent by: Send to e-mail at: [email or web address]  Will anyone else be joining your video visit? No    Video Start Time: 1:30 PM  Video-Visit Details    Type of service:  Video Visit    Video End Time:1:51 PM    Originating Location (pt. Location): Home    Distant Location (provider location):  [facility] SLEEP [facility]     Platform used for Video Visit: AmEtaoshi     Does patient have any form of state insurance? N    Do you have wifi? Y  Do you have a smart phone? Y  Can you download an rober on your phone comfortably with out assistance? Y  Can you watch a Youtube video? JAMES HALLMAN CMA, SLEEP MEDICINE, 8/10/2021 12:00 PM    Additional 15 minutes was spent performing the following:    -Preparing to see the patient  -Ordering medications, tests, or procedures   -Documenting clinical information in the electronic or other health record     Thank you for the opportunity to participate in the care of  Jeancarlos Ureña.    Assessment and Plan:    In summary Jeancarlos Ureña is a 61 year old year old male here for sleep disturbance.  1. Witness apnea/Snoring/Frequent nocturnal awakenings   Jeancarlos Ureña has high risk for obstructive sleep apnea based on the history of witness apnea, snoring, frequent nocturnal awakenings and a crowded airway. I educated the patient on the underlying pathophysiology of obstructive sleep apnea. We reviewed the risks associated with sleep apnea, including increased cardiovascular risk and overall death. We talked about treatments briefly. I recommend getting a Home sleep study. The patient should return to the clinic to discuss results and treatment option in a patient-centered approach.    History of present illness:    He is a 61 year old male who comes to the [facility]  with a chief complaint of frequent nocturnal awakening that has been going on for at least 5 years.  While the patient states that his sleep quality is excellent, he has been observed by his wife as having pauses in his breathing during sleep followed by snoring.  The patient states that sometimes he wakes up due to nocturia.  However he also frequently wakes up to snack before going back to bed.     Ideal Sleep-Wake Cycle(devoid of societal pressure):    Patient would try to initiate sleep at around 10-12 at night with a sleep latency of less than 10 minutes. The patient would have 3-6 awakenings. Final wake up time is around 7 AM.    Total score - [place]: 4 (8/9/2021 10:57 AM)    Patient told to return in one week after the sleep study is interpreted.    Patient Active Problem List   Diagnosis     Left ventricular hypertrophy     Dyslipidemia     Impaired fasting glucose     Low bone density     Hypertension, unspecified type     Past Medical History:   Diagnosis Date     Coronary artery disease 15 years ago    Enlarged heart?     Diabetes (H) Five years ago?    Pre-diabetes     Gastroesophageal reflux disease 2 and 20 years ago    Difficulty swallowing     Hyperlipidemia 2-3 years ago    Medication and now low     Hypertension Twenty years ago?    Two pills each day - 135/75     History reviewed. No pertinent surgical history.  Current Outpatient Medications   Medication Sig Dispense Refill     atorvastatin (LIPITOR) 20 MG tablet Take 20 mg by mouth       clindamycin (CLEOCIN T) 1 % external solution To the scalp 60 mL 1     diltiazem ER (TIAZAC) 300 MG 24 hr ER beaded capsule Take 300 mg by mouth       fluocinonide (LIDEX) 0.05 % external solution Apply to the affected areas on the scalp twice daily as needed.       ibuprofen (ADVIL/MOTRIN) 200 MG tablet Take 200 mg by mouth as needed for mild pain       irbesartan-hydrochlorothiazide (AVALIDE) 300-12.5 MG tablet Take 1 tablet by mouth        loratadine-pseudoePHEDrine (CLARITIN-D 12-HOUR) 5-120 MG 12 hr tablet        Penicillins  Social History     Socioeconomic History     Marital status:      Spouse name: Not on file     Number of children: Not on file     Years of education: Not on file     Highest education level: Not on file   Occupational History     Not on file   Tobacco Use     Smoking status: Never Smoker     Smokeless tobacco: Never Used   Substance and Sexual Activity     Alcohol use: Yes     Comment: 1-2 drinks per day - usually beer     Drug use: Not Currently     Types: Marijuana     Comment: Once or twice a year     Sexual activity: Yes     Partners: Female     Birth control/protection: Male Surgical   Other Topics Concern     Not on file   Social History Narrative     Not on file     Social Determinants of Health     Financial Resource Strain:      Difficulty of Paying Living Expenses:    Food Insecurity:      Worried About Running Out of Food in the Last Year:      Ran Out of Food in the Last Year:    Transportation Needs:      Lack of Transportation (Medical):      Lack of Transportation (Non-Medical):    Physical Activity:      Days of Exercise per Week:      Minutes of Exercise per Session:    Stress:      Feeling of Stress :    Social Connections:      Frequency of Communication with Friends and Family:      Frequency of Social Gatherings with Friends and Family:      Attends Restoration Services:      Active Member of Clubs or Organizations:      Attends Club or Organization Meetings:      Marital Status:    Intimate Partner Violence:      Fear of Current or Ex-Partner:      Emotionally Abused:      Physically Abused:      Sexually Abused:      Family History   Problem Relation Age of Onset     Diabetes Father      Hyperlipidemia Father      Hypertension Father         Physical Exam:  GEN: NAD,   Head: Normocephalic.  EYES: EOMI  ENT: Oropharynx is clear, Larsen class 4+ airway.   Psych: normal mood, normal affect  Snore  test:(+)     Labs/Studies:     No results found for: PH, PHARTERIAL, PO2, [id number], SAT, PCO2, HCO3, BASEEXCESS, SANTOS, BEB  Lab Results   Component Value Date    TSH 1.85 06/17/2021     Lab Results   Component Value Date     (H) 06/17/2021     Lab Results   Component Value Date    HGB 14.3 06/17/2021     Lab Results   Component Value Date    CR 0.71 06/17/2021     Lab Results   Component Value Date    ALT 37 06/17/2021    ALKPHOS 72 06/17/2021     No results found for: UAMP, UBARB, BENZODIAZEUR, UCANN, UCOC, OPIT, UPCP    Recent Labs   Lab Test 06/17/21  0722      POTASSIUM 3.7   *   CR 0.71   CHAVA 8.6       No results found for: FREYA Nicholson DO  Board Certified in Internal Medicine and Sleep Medicine    (Note created with Dragon voice recognition and unintended spelling errors and word substitutions may occur)

## 2021-08-12 NOTE — PROGRESS NOTES
Watchpat order forwarded to Zee robles.     Gris HALLMAN CMA, SLEEP MEDICINE, 8/12/2021 8:27 AM

## 2021-08-13 NOTE — TELEPHONE ENCOUNTER
----- Message from Carly Arizmendi CMA sent at 8/12/2021  8:25 AM CDT -----  Regarding: WATCHPAT  HST Watchpat mail out order was placed by Dr. Nicholson 8/10/21. Could you please schedule this and also a 2.5 week follow up visit for test results?       Thank you-    Gris HALLMAN CMA, SLEEP MEDICINE, 8/12/2021 8:26 AM

## 2021-08-20 ENCOUNTER — TRANSFERRED RECORDS (OUTPATIENT)
Dept: HEALTH INFORMATION MANAGEMENT | Facility: CLINIC | Age: 61
End: 2021-08-20

## 2021-08-23 ENCOUNTER — OFFICE VISIT (OUTPATIENT)
Dept: SLEEP MEDICINE | Facility: CLINIC | Age: 61
End: 2021-08-23
Attending: INTERNAL MEDICINE
Payer: COMMERCIAL

## 2021-08-23 DIAGNOSIS — G47.00 FREQUENT NOCTURNAL AWAKENING: ICD-10-CM

## 2021-08-23 DIAGNOSIS — R06.81 WITNESSED EPISODE OF APNEA: ICD-10-CM

## 2021-08-23 DIAGNOSIS — R06.83 SNORING: ICD-10-CM

## 2021-08-23 PROCEDURE — 95800 SLP STDY UNATTENDED: CPT | Performed by: INTERNAL MEDICINE

## 2021-08-23 NOTE — PROGRESS NOTES
Device has been registered and shipped via [facility] on 8/23/21. Patient was notified that package was mailed out. Watch [facility] serial number [id number]

## 2021-09-01 NOTE — PROGRESS NOTES
Watch pat has been scored using rule 1B, 4%.   Patient to follow up with provider to determine appropriate therapy.     Pat AHI: 8.6      Ordering Provider: DO [facility]ee [facility] [place], [facility]  Sleep Technologist

## 2021-09-03 ENCOUNTER — TELEPHONE (OUTPATIENT)
Dept: SLEEP MEDICINE | Facility: CLINIC | Age: 61
End: 2021-09-03

## 2021-09-03 NOTE — TELEPHONE ENCOUNTER
Reason for call:  Other   Patient called regarding (reason for call): call back  Additional comments: Patient is calling to confirm that his sleep study worked. Please call back.    Phone number to reach patient:  Home number on file none (home)    Best Time:  Any time    Can we leave a detailed message on this number?  YES    Travel screening: Not Applicable

## 2021-09-04 ENCOUNTER — HEALTH MAINTENANCE LETTER (OUTPATIENT)
Age: 61
End: 2021-09-04

## 2021-09-07 NOTE — PROCEDURES
"WatchPAT - HOME SLEEP STUDY INTERPRETATION    Patient: Jeancarlos Ureña  MRN: [id number]  YOB: 1960  Study Date: 8/31/2021  Referring Provider: Leyla Mendez MD  Ordering Provider: Toro Nicholson DO    Chain of custody patient verification was not enabled.  Chain of custody verification was not present throughout the entire study.     Indications for Home Study: Jeancarlos Ureña is a 61 year old male who presents with symptoms suggestive of obstructive sleep apnea.    Estimated body mass index is 26.69 kg/m  as calculated from the following:    Height as of 8/10/21: 1.778 m (5' 10\").    Weight as of 8/10/21: 84.4 kg (186 lb).  Total score - [place]: 4 (8/9/2021 10:57 AM)    Data: A full night home sleep study was performed recording the standard physiologic parameters including peripheral arterial tonometry (PAT), sound/snoring, body position,  movement, sound, and oxygen saturation by pulse oximetry. Pulse rate was estimated by oximetry recording. Sleep staging (wake, REM, light, and deep sleep) was derived from PAT signal.  This study was considered adequate based on > 4 hours of quality oximetry and respiratory recording. As specified by the AASM Manual for the Scoring of Sleep and Associated events, version 2.3, Rule VIII.D 1B, 4% oxygen desaturation scoring for hypopneas is used as a standard of care on all home sleep apnea testing.    Total Recording Time: 7 hrs, 19 min  Total Sleep Time: 3 hrs, 43 min  % of Sleep Time REM: 18.4%    Respiratory:  Snoring: Snoring was present.  Respiratory events: The PAT respiratory disturbance index [pRDI] was 12.7 events per hour.  The PAT apnea/hypopnea index [pAHI] was 8.6 events per hour.  SEEVRIANO was 8.8 events per hour.  During REM sleep the pAHI was 0.  Sleep Associated Hypoxemia: sustained hypoxemia was not present. Mean oxygen saturation was 95%.  Minimum was 84%.  Time with saturation less than 88% was 0.1 minutes.    Heart Rate: By pulse " oximetry normal rate was noted.     Position: Percent of time spent: supine -30.5%, prone -5.2%, on right -0%, on left -64.4%.  pAHI was 19.3 per hour supine, 0 per hour prone, N/A per hour on right side, and 4.3 per hour on left side.     Assessment:   Mild obstructive sleep apnea.  Sleep associated hypoxemia was not present.    Recommendations:  Consider auto-CPAP at 5-15 cmH2O, oral appliance therapy or positional therapy.  Suggest optimizing sleep hygiene and avoiding sleep deprivation.  Weight management.    Diagnosis Code(s): Obstructive Sleep Apnea G47.33    Toro Nicholson DO, September 7, 2021   Diplomate, American Board of Internal Medicine, Sleep Medicine

## 2021-09-30 VITALS — HEIGHT: 70 IN | WEIGHT: 186 LBS | BODY MASS INDEX: 26.63 KG/M2

## 2021-09-30 ASSESSMENT — MIFFLIN-ST. JEOR: SCORE: 1644.94

## 2021-09-30 NOTE — PROGRESS NOTES
Asa is a 61 year old who is being evaluated via a billable video visit.      How would you like to obtain your AVS? MyChart  If the video visit is dropped, the invitation should be resent by: Send to e-mail at: [email or web address]  Will anyone else be joining your video visit? No    Video Start Time: 9:17 AM  Video-Visit Details    Type of service:  Video Visit    Video End Time:9:44 AM    Originating Location (pt. Location): Home    Distant Location (provider location):  [facility]     Platform used for Video Visit: [employer]ight[employer]    Additional 10 minutes was spent performing the following:    -Preparing to see the patient (eg, review of tests)   -Ordering medications, tests, or procedures   -Documenting clinical information in the electronic or other health record     Thank you for the opportunity to participate in the care of Jeancarlos Ureña.     He is a 61 year old  male patient who comes to the sleep medicine clinic for review of sleep study results. The study was completed on 08/31/21  which showed that the patient had mild obstructive sleep apnea with an AHI of 8.6 events per hour.    Assessment and Plan:  In summary Jeancarlos Ureña is a 61 year old year old male here for review of sleep study results.  1. Obstructive Sleep Apnea  We had an extensive conversation to review the results of his sleep study and to  him on the importance of treating sleep apnea. We discussed the options of treatment with oral appliance, positional therapy versus CPAP. The patient would like to think about his options before making a final decision. He is welcomed to follow up with me on an as needed basis.    NICKOLAS:  NICKOLAS Total Score: 9  Total score - [place]: 4 (9/30/2021  9:00 AM)    Patient Active Problem List   Diagnosis     Left ventricular hypertrophy     Dyslipidemia     Impaired fasting glucose     Low bone density     Hypertension, unspecified type       Current Outpatient  Medications   Medication Sig Dispense Refill     atorvastatin (LIPITOR) 20 MG tablet Take 20 mg by mouth       clindamycin (CLEOCIN T) 1 % external solution To the scalp 60 mL 1     diltiazem ER (TIAZAC) 300 MG 24 hr ER beaded capsule Take 300 mg by mouth       fluocinonide (LIDEX) 0.05 % external solution Apply to the affected areas on the scalp twice daily as needed.       ibuprofen (ADVIL/MOTRIN) 200 MG tablet Take 200 mg by mouth as needed for mild pain       irbesartan-hydrochlorothiazide (AVALIDE) 300-12.5 MG tablet Take 1 tablet by mouth       loratadine-pseudoePHEDrine (CLARITIN-D 12-HOUR) 5-120 MG 12 hr tablet          Allergies   Allergen Reactions     Penicillins      PN: Unknown Reaction       Physical Exam:  GEN: NAD  Psych: normal mood, normal affect     Labs/Studies:  - We reviewed the results of the overnight study as described on the HPI.     No results found for: FREYA      Patient verbalized understanding of these issues, agrees with the plan and all questions were answered today. Patient was given an opportuntity to voice any other symptoms or concerns not listed above. Patient did not have any other symptoms or concerns.      Toro Nicholson DO  Board Certified in Internal Medicine and Sleep Medicine      (Note created with Dragon voice recognition and unintended spelling errors and word substitutions may occur)

## 2021-09-30 NOTE — PATIENT INSTRUCTIONS
Your BMI is Body mass index is 26.69 kg/m .  Weight management is a personal decision.  If you are interested in exploring weight loss strategies, the following discussion covers the approaches that may be successful. Body mass index (BMI) is one way to tell whether you are at a healthy weight, overweight, or obese. It measures your weight in relation to your height.  A BMI of 18.5 to 24.9 is in the healthy range. A person with a BMI of 25 to 29.9 is considered overweight, and someone with a BMI of 30 or greater is considered obese. More than two-thirds of American adults are considered overweight or obese.  Being overweight or obese increases the risk for further weight gain. Excess weight may lead to heart disease and diabetes.  Creating and following plans for healthy eating and physical activity may help you improve your health.  Weight control is part of healthy lifestyle and includes exercise, emotional health, and healthy eating habits. Careful eating habits lifelong are the mainstay of weight control. Though there are significant health benefits from weight loss, long-term weight loss with diet alone may be very difficult to achieve- studies show long-term success with dietary management in less than 10% of people. Attaining a healthy weight may be especially difficult to achieve in those with severe obesity. In some cases, medications, devices and surgical management might be considered.  What can you do?  If you are overweight or obese and are interested in methods for weight loss, you should discuss this with your provider.     Consider reducing daily calorie intake by 500 calories.     Keep a food journal.     Avoiding skipping meals, consider cutting portions instead.    Diet combined with exercise helps maintain muscle while optimizing fat loss. Strength training is particularly important for building and maintaining muscle mass. Exercise helps reduce stress, increase energy, and improves fitness.  "Increasing exercise without diet control, however, may not burn enough calories to loose weight.       Start walking three days a week 10-20 minutes at a time    Work towards walking thirty minutes five days a week     Eventually, increase the speed of your walking for 1-2 minutes at time    In addition, we recommend that you review healthy lifestyles and methods for weight loss available through the National Institutes of Health patient information sites:  http://win.niddk.nih.gov/publications/index.htm    And look into health and wellness programs that may be available through your health insurance provider, employer, local community center, or korin club.    Weight management plan: Patient was referred to their PCP to discuss a diet and exercise plan.   What is sleep apnea?    Sleep apnea is a condition that makes you stop breathing for short periods while you are asleep. There are 2 types of sleep apnea. One is called \"obstructive sleep apnea,\" and the other is called \"central sleep apnea.\"  In obstructive sleep apnea, you stop breathing because your throat narrows or closes. In central sleep apnea, you stop breathing because your brain does not send the right signals to your muscles to make you breathe. When people talk about sleep apnea, they are usually referring to obstructive sleep apnea, which is what this article is about.  People with sleep apnea do not know that they stop breathing when they are asleep. But they do sometimes wake up startled or gasping for breath. They also often hear from loved ones that they snore.  What are the symptoms of sleep apnea? -- The main symptoms of sleep apnea are loud snoring, tiredness, and daytime sleepiness. Other symptoms can include:  ?Restless sleep  ?Waking up choking or gasping  ?Morning headaches, dry mouth, or sore throat  ?Waking up often to urinate  ?Waking up feeling unrested or groggy  ?Trouble thinking clearly or remembering things  Some people with sleep " "apnea don't have symptoms, or they don't know they have them. They might figure that it's normal to be tired or to snore a lot.  Should I see a doctor or nurse? -- Yes. If you think you might have sleep apnea, see your doctor.  Is there a test for sleep apnea? -- Yes. If your doctor or nurse suspects you have sleep apnea, he or she might send you for a \"sleep study.\" Sleep studies can sometimes be done at home, but they are usually done in a sleep lab. For the study, you spend the night in the lab, and you are hooked up to different machines that monitor your heart rate, breathing, and other body functions. The results of the test will tell your doctor or nurse if you have the disorder.  Is there anything I can do on my own to help my sleep apnea? -- Yes. Here are some things that might help:  ?Stay off your back when sleeping. (This is not always practical, because people cannot control their position while asleep. Plus, it only helps some people.)  ?Lose weight, if you are overweight  ?Avoid alcohol, because it can make sleep apnea worse  How is sleep apnea treated? -- The most effective treatment for sleep apnea is a device that keeps your airway open while you sleep. Treatment with this device is called \"continuous positive airway pressure,\" or CPAP. People getting CPAP wear a face mask at night that keeps them breathing.  If your doctor or nurse recommends a CPAP machine, try to be patient about using it. The mask might seem uncomfortable to wear at first, and the machine might seem noisy, but using the machine can really pay off. People with sleep apnea who use a CPAP machine feel more rested and generally feel better.  There is also another device that you wear in your mouth called an \"oral appliance\" or \"mandibular advancement device.\" It also helps keep your airway open while you sleep.  In rare cases, when nothing else helps, doctors recommend surgery to keep the airway open. Surgery to do this is not " always effective, and even when it is, the problem can come back.  Is sleep apnea dangerous? -- It can be. People with sleep apnea do not get good-quality sleep, so they are often tired and not alert. This puts them at risk for car accidents and other types of accidents. Plus, studies show that people with sleep apnea are more likely than others to have high blood pressure, heart attacks, and other serious heart problems. In people with severe sleep apnea, getting treated (for example, with a CPAP machine) can help prevent some of these problems.  Normally when a person sleeps, the airway remains open, and air can pass from the nose and mouth to the lungs. In a person with sleep apnea, parts of the throat and mouth drop into the airway and block off the flow of air. This can cause loud snoring and interrupt breathing for short periods.    The CPAP mask gently blows air into your nose while you sleep. It puts just enough pressure on your airway to keep it from closing. The mask in this picture fits over just the nose. Other CPAP devices have masks that fit over the nose and mouth.     Equipment Instructions    We will process your PAP order and send it to a Durable Medical Equipment (DME) provider.    The medical equipment company should call you within 7 days.  If you have not heard from the company, please contact them to see if they received your order and are planning to call you.    Please call us at [phone] if you are unable to contact the medical equipment company or if they do not have the order.    If you are starting a new PAP machine, please call us after you use it the first night to let us know how it went. This call also helps us know that you received your equipment and that everything is ready. Please use our central phone number [phone]    Contact information for [employer] company:    - [facility] Tel: [phone]  -[facility] Tel: [phone]  -[facility] Tel:  [phone]  -[employer] Tel: [phone]  -[facility] Tel: [phone]  -[facility] Tel [phone]  -[facility] Tel: [phone]  -[facility] Tel: [phone]  -[facility] Tel: [phone]  -[facility] Tel: [phone]  -[employer] Tel: [phone]  -[facility]: [phone]

## 2021-10-01 ENCOUNTER — TELEPHONE (OUTPATIENT)
Dept: INTERNAL MEDICINE | Facility: CLINIC | Age: 61
End: 2021-10-01

## 2021-10-01 ENCOUNTER — VIRTUAL VISIT (OUTPATIENT)
Dept: SLEEP MEDICINE | Facility: CLINIC | Age: 61
End: 2021-10-01
Payer: COMMERCIAL

## 2021-10-01 DIAGNOSIS — G47.33 OBSTRUCTIVE SLEEP APNEA: Primary | ICD-10-CM

## 2021-10-01 DIAGNOSIS — I10 HYPERTENSION, UNSPECIFIED TYPE: ICD-10-CM

## 2021-10-01 PROCEDURE — 99213 OFFICE O/P EST LOW 20 MIN: CPT | Mod: 95 | Performed by: INTERNAL MEDICINE

## 2021-10-01 NOTE — CONFIDENTIAL NOTE
"Asa called with updates on several pending issues.      States headaches resolved with elimination of analgesic use.      Saw hem/onc at [facility] who noted monoclonal gammopathy which was not detectable on repeat testing; follow-up monitoring planned.    Sleep evaluation identified mild DANIELLE (8.6 score; 5-15 considered \"mild\") and offered treatment with CPAP vs oral device.  He would like to reduce weight, eliminate evening alcohol use, and have spouse monitor.  We agreed he would request repeat testing to verify improvement (if efforts at above are successful) or proceed with treatment (if unsuccessful) over the next few months.  "

## 2021-10-05 ENCOUNTER — MYC MEDICAL ADVICE (OUTPATIENT)
Dept: SLEEP MEDICINE | Facility: CLINIC | Age: 61
End: 2021-10-05

## 2022-03-19 ENCOUNTER — PATIENT OUTREACH (OUTPATIENT)
Dept: DERMATOLOGY | Facility: CLINIC | Age: 62
End: 2022-03-19
Payer: COMMERCIAL

## 2022-03-19 NOTE — CONFIDENTIAL NOTE
Contacted patient to arrange annual Dermatology Clinic visit and he declined at this time. He states that his appointment will be made through the [facility].

## 2022-08-06 ENCOUNTER — HEALTH MAINTENANCE LETTER (OUTPATIENT)
Age: 62
End: 2022-08-06

## 2022-10-16 ENCOUNTER — HEALTH MAINTENANCE LETTER (OUTPATIENT)
Age: 62
End: 2022-10-16

## 2023-06-17 ENCOUNTER — HEALTH MAINTENANCE LETTER (OUTPATIENT)
Age: 63
End: 2023-06-17

## 2024-08-10 ENCOUNTER — HEALTH MAINTENANCE LETTER (OUTPATIENT)
Age: 64
End: 2024-08-10